# Patient Record
Sex: FEMALE | Race: WHITE | Employment: OTHER | URBAN - NONMETROPOLITAN AREA
[De-identification: names, ages, dates, MRNs, and addresses within clinical notes are randomized per-mention and may not be internally consistent; named-entity substitution may affect disease eponyms.]

---

## 2018-09-15 ENCOUNTER — APPOINTMENT (OUTPATIENT)
Dept: INTERVENTIONAL RADIOLOGY/VASCULAR | Age: 73
DRG: 202 | End: 2018-09-15
Payer: MEDICARE

## 2018-09-15 ENCOUNTER — APPOINTMENT (OUTPATIENT)
Dept: GENERAL RADIOLOGY | Age: 73
DRG: 202 | End: 2018-09-15
Payer: MEDICARE

## 2018-09-15 ENCOUNTER — HOSPITAL ENCOUNTER (INPATIENT)
Age: 73
LOS: 6 days | Discharge: NURSING, REHAB OR LONG-TERM CARE | DRG: 202 | End: 2018-09-21
Attending: INTERNAL MEDICINE
Payer: MEDICARE

## 2018-09-15 ENCOUNTER — APPOINTMENT (OUTPATIENT)
Dept: CT IMAGING | Age: 73
DRG: 202 | End: 2018-09-15
Payer: MEDICARE

## 2018-09-15 DIAGNOSIS — R50.9 FEBRILE ILLNESS: Primary | ICD-10-CM

## 2018-09-15 LAB
ALBUMIN SERPL-MCNC: 3.8 G/DL (ref 3.5–5.1)
ALP BLD-CCNC: 79 U/L (ref 38–126)
ALT SERPL-CCNC: 9 U/L (ref 11–66)
ANION GAP SERPL CALCULATED.3IONS-SCNC: 14 MEQ/L (ref 8–16)
AST SERPL-CCNC: 21 U/L (ref 5–40)
BACTERIA: ABNORMAL /HPF
BASE EXCESS (CALCULATED): 1.7 MMOL/L (ref -2.5–2.5)
BASOPHILS # BLD: 0.3 %
BASOPHILS ABSOLUTE: 0 THOU/MM3 (ref 0–0.1)
BILIRUB SERPL-MCNC: 0.6 MG/DL (ref 0.3–1.2)
BILIRUBIN DIRECT: < 0.2 MG/DL (ref 0–0.3)
BILIRUBIN URINE: NEGATIVE
BLOOD, URINE: NEGATIVE
BUN BLDV-MCNC: 16 MG/DL (ref 7–22)
C-REACTIVE PROTEIN: 0.07 MG/DL (ref 0–1)
CALCIUM SERPL-MCNC: 8.6 MG/DL (ref 8.5–10.5)
CASTS 2: ABNORMAL /LPF
CASTS UA: ABNORMAL /LPF
CHARACTER, URINE: CLEAR
CHLORIDE BLD-SCNC: 99 MEQ/L (ref 98–111)
CO2: 24 MEQ/L (ref 23–33)
COLLECTED BY:: ABNORMAL
COLOR: YELLOW
CREAT SERPL-MCNC: 1 MG/DL (ref 0.4–1.2)
CRYSTALS, UA: ABNORMAL
DEVICE: ABNORMAL
EKG ATRIAL RATE: 87 BPM
EKG P AXIS: 53 DEGREES
EKG P-R INTERVAL: 176 MS
EKG Q-T INTERVAL: 366 MS
EKG QRS DURATION: 80 MS
EKG QTC CALCULATION (BAZETT): 440 MS
EKG R AXIS: 38 DEGREES
EKG T AXIS: 54 DEGREES
EKG VENTRICULAR RATE: 87 BPM
EOSINOPHIL # BLD: 0 %
EOSINOPHILS ABSOLUTE: 0 THOU/MM3 (ref 0–0.4)
EPITHELIAL CELLS, UA: ABNORMAL /HPF
ERYTHROCYTE [DISTWIDTH] IN BLOOD BY AUTOMATED COUNT: 12.2 % (ref 11.5–14.5)
ERYTHROCYTE [DISTWIDTH] IN BLOOD BY AUTOMATED COUNT: 43.2 FL (ref 35–45)
FLU A ANTIGEN: NEGATIVE
FLU B ANTIGEN: NEGATIVE
GFR SERPL CREATININE-BSD FRML MDRD: 55 ML/MIN/1.73M2
GLUCOSE BLD-MCNC: 112 MG/DL (ref 70–108)
GLUCOSE URINE: NEGATIVE MG/DL
HCO3: 26 MMOL/L (ref 23–28)
HCT VFR BLD CALC: 39.5 % (ref 37–47)
HEMOGLOBIN: 13.3 GM/DL (ref 12–16)
IMMATURE GRANS (ABS): 0.07 THOU/MM3 (ref 0–0.07)
IMMATURE GRANULOCYTES: 0.7 %
KETONES, URINE: 15
LACTIC ACID, SEPSIS: 1.2 MMOL/L (ref 0.5–1.9)
LACTIC ACID: 1.5 MMOL/L (ref 0.5–2.2)
LEUKOCYTE ESTERASE, URINE: NEGATIVE
LIPASE: 53.9 U/L (ref 5.6–51.3)
LYMPHOCYTES # BLD: 17.2 %
LYMPHOCYTES ABSOLUTE: 1.8 THOU/MM3 (ref 1–4.8)
MCH RBC QN AUTO: 32.7 PG (ref 26–33)
MCHC RBC AUTO-ENTMCNC: 33.7 GM/DL (ref 32.2–35.5)
MCV RBC AUTO: 97.1 FL (ref 81–99)
MISCELLANEOUS 2: ABNORMAL
MONOCYTES # BLD: 5 %
MONOCYTES ABSOLUTE: 0.5 THOU/MM3 (ref 0.4–1.3)
NITRITE, URINE: NEGATIVE
NUCLEATED RED BLOOD CELLS: 0 /100 WBC
O2 SATURATION: 91 %
OSMOLALITY CALCULATION: 275.8 MOSMOL/KG (ref 275–300)
PCO2: 37 MMHG (ref 35–45)
PH BLOOD GAS: 7.45 (ref 7.35–7.45)
PH UA: 6
PLATELET # BLD: 176 THOU/MM3 (ref 130–400)
PMV BLD AUTO: 9.6 FL (ref 9.4–12.4)
PO2: 59 MMHG (ref 71–104)
POTASSIUM SERPL-SCNC: 4.1 MEQ/L (ref 3.5–5.2)
PRO-BNP: 650.2 PG/ML (ref 0–900)
PROCALCITONIN: 0.05 NG/ML (ref 0.01–0.09)
PROTEIN UA: 30
RBC # BLD: 4.07 MILL/MM3 (ref 4.2–5.4)
RBC URINE: ABNORMAL /HPF
RENAL EPITHELIAL, UA: ABNORMAL
SEG NEUTROPHILS: 76.8 %
SEGMENTED NEUTROPHILS ABSOLUTE COUNT: 8.1 THOU/MM3 (ref 1.8–7.7)
SODIUM BLD-SCNC: 137 MEQ/L (ref 135–145)
SOURCE, BLOOD GAS: ABNORMAL
SPECIFIC GRAVITY, URINE: 1.02 (ref 1–1.03)
TOTAL PROTEIN: 7.1 G/DL (ref 6.1–8)
TROPONIN T: < 0.01 NG/ML
UROBILINOGEN, URINE: 0.2 EU/DL
WBC # BLD: 10.5 THOU/MM3 (ref 4.8–10.8)
WBC UA: ABNORMAL /HPF
YEAST: ABNORMAL

## 2018-09-15 PROCEDURE — 83605 ASSAY OF LACTIC ACID: CPT

## 2018-09-15 PROCEDURE — 99285 EMERGENCY DEPT VISIT HI MDM: CPT

## 2018-09-15 PROCEDURE — 96365 THER/PROPH/DIAG IV INF INIT: CPT

## 2018-09-15 PROCEDURE — 1200000003 HC TELEMETRY R&B

## 2018-09-15 PROCEDURE — 93005 ELECTROCARDIOGRAM TRACING: CPT | Performed by: INTERNAL MEDICINE

## 2018-09-15 PROCEDURE — 83690 ASSAY OF LIPASE: CPT

## 2018-09-15 PROCEDURE — 70450 CT HEAD/BRAIN W/O DYE: CPT

## 2018-09-15 PROCEDURE — 93010 ELECTROCARDIOGRAM REPORT: CPT | Performed by: INTERNAL MEDICINE

## 2018-09-15 PROCEDURE — 72170 X-RAY EXAM OF PELVIS: CPT

## 2018-09-15 PROCEDURE — 2580000003 HC RX 258: Performed by: INTERNAL MEDICINE

## 2018-09-15 PROCEDURE — 85025 COMPLETE CBC W/AUTO DIFF WBC: CPT

## 2018-09-15 PROCEDURE — 2709999900 HC NON-CHARGEABLE SUPPLY

## 2018-09-15 PROCEDURE — 81001 URINALYSIS AUTO W/SCOPE: CPT

## 2018-09-15 PROCEDURE — 6360000002 HC RX W HCPCS: Performed by: INTERNAL MEDICINE

## 2018-09-15 PROCEDURE — 72125 CT NECK SPINE W/O DYE: CPT

## 2018-09-15 PROCEDURE — 82803 BLOOD GASES ANY COMBINATION: CPT

## 2018-09-15 PROCEDURE — 80053 COMPREHEN METABOLIC PANEL: CPT

## 2018-09-15 PROCEDURE — 71045 X-RAY EXAM CHEST 1 VIEW: CPT

## 2018-09-15 PROCEDURE — 84484 ASSAY OF TROPONIN QUANT: CPT

## 2018-09-15 PROCEDURE — 6370000000 HC RX 637 (ALT 250 FOR IP): Performed by: INTERNAL MEDICINE

## 2018-09-15 PROCEDURE — 83880 ASSAY OF NATRIURETIC PEPTIDE: CPT

## 2018-09-15 PROCEDURE — 87804 INFLUENZA ASSAY W/OPTIC: CPT

## 2018-09-15 PROCEDURE — 87040 BLOOD CULTURE FOR BACTERIA: CPT

## 2018-09-15 PROCEDURE — 86140 C-REACTIVE PROTEIN: CPT

## 2018-09-15 PROCEDURE — 36600 WITHDRAWAL OF ARTERIAL BLOOD: CPT

## 2018-09-15 PROCEDURE — 84145 PROCALCITONIN (PCT): CPT

## 2018-09-15 PROCEDURE — 93880 EXTRACRANIAL BILAT STUDY: CPT

## 2018-09-15 PROCEDURE — 36415 COLL VENOUS BLD VENIPUNCTURE: CPT

## 2018-09-15 PROCEDURE — 82248 BILIRUBIN DIRECT: CPT

## 2018-09-15 RX ORDER — SODIUM CHLORIDE 0.9 % (FLUSH) 0.9 %
10 SYRINGE (ML) INJECTION EVERY 12 HOURS SCHEDULED
Status: DISCONTINUED | OUTPATIENT
Start: 2018-09-15 | End: 2018-09-19 | Stop reason: SDUPTHER

## 2018-09-15 RX ORDER — TRAMADOL HYDROCHLORIDE 50 MG/1
50 TABLET ORAL EVERY 6 HOURS PRN
COMMUNITY

## 2018-09-15 RX ORDER — AZITHROMYCIN 250 MG/1
500 TABLET, FILM COATED ORAL ONCE
Status: COMPLETED | OUTPATIENT
Start: 2018-09-15 | End: 2018-09-15

## 2018-09-15 RX ORDER — ACETAMINOPHEN 325 MG/1
650 TABLET ORAL EVERY 4 HOURS PRN
Status: DISCONTINUED | OUTPATIENT
Start: 2018-09-15 | End: 2018-09-21 | Stop reason: HOSPADM

## 2018-09-15 RX ORDER — SODIUM CHLORIDE 0.9 % (FLUSH) 0.9 %
10 SYRINGE (ML) INJECTION PRN
Status: DISCONTINUED | OUTPATIENT
Start: 2018-09-15 | End: 2018-09-19 | Stop reason: SDUPTHER

## 2018-09-15 RX ORDER — 0.9 % SODIUM CHLORIDE 0.9 %
1000 INTRAVENOUS SOLUTION INTRAVENOUS ONCE
Status: COMPLETED | OUTPATIENT
Start: 2018-09-15 | End: 2018-09-15

## 2018-09-15 RX ORDER — ACETAMINOPHEN 325 MG/1
650 TABLET ORAL ONCE
Status: COMPLETED | OUTPATIENT
Start: 2018-09-15 | End: 2018-09-15

## 2018-09-15 RX ORDER — SODIUM CHLORIDE 9 MG/ML
INJECTION, SOLUTION INTRAVENOUS CONTINUOUS
Status: DISCONTINUED | OUTPATIENT
Start: 2018-09-15 | End: 2018-09-18

## 2018-09-15 RX ORDER — TRAMADOL HYDROCHLORIDE 50 MG/1
50 TABLET ORAL EVERY 6 HOURS PRN
Status: DISCONTINUED | OUTPATIENT
Start: 2018-09-15 | End: 2018-09-18

## 2018-09-15 RX ORDER — AZITHROMYCIN 250 MG/1
250 TABLET, FILM COATED ORAL DAILY
Status: COMPLETED | OUTPATIENT
Start: 2018-09-16 | End: 2018-09-19

## 2018-09-15 RX ORDER — GUAIFENESIN, PSEUDOEPHEDRINE HYDROCHLORIDE 600; 60 MG/1; MG/1
1 TABLET, EXTENDED RELEASE ORAL EVERY 12 HOURS
COMMUNITY

## 2018-09-15 RX ADMIN — CEFEPIME HYDROCHLORIDE 2 G: 2 INJECTION, POWDER, FOR SOLUTION INTRAVENOUS at 09:48

## 2018-09-15 RX ADMIN — VANCOMYCIN HYDROCHLORIDE 750 MG: 1 INJECTION, POWDER, LYOPHILIZED, FOR SOLUTION INTRAVENOUS at 11:35

## 2018-09-15 RX ADMIN — SODIUM CHLORIDE 1000 ML: 9 INJECTION, SOLUTION INTRAVENOUS at 08:50

## 2018-09-15 RX ADMIN — ACETAMINOPHEN 650 MG: 325 TABLET ORAL at 08:38

## 2018-09-15 RX ADMIN — SODIUM CHLORIDE: 9 INJECTION, SOLUTION INTRAVENOUS at 23:54

## 2018-09-15 RX ADMIN — ACETAMINOPHEN 650 MG: 325 TABLET ORAL at 17:59

## 2018-09-15 RX ADMIN — CEFTRIAXONE SODIUM 1 G: 1 INJECTION, POWDER, FOR SOLUTION INTRAMUSCULAR; INTRAVENOUS at 17:59

## 2018-09-15 RX ADMIN — SODIUM CHLORIDE: 9 INJECTION, SOLUTION INTRAVENOUS at 13:10

## 2018-09-15 RX ADMIN — AZITHROMYCIN 500 MG: 250 TABLET, FILM COATED ORAL at 17:59

## 2018-09-15 ASSESSMENT — ENCOUNTER SYMPTOMS
VOMITING: 0
SORE THROAT: 0
ABDOMINAL PAIN: 0
CONSTIPATION: 0
RHINORRHEA: 0
COUGH: 1
WHEEZING: 0
NAUSEA: 0
SHORTNESS OF BREATH: 0
BACK PAIN: 0
BLOOD IN STOOL: 0
DIARRHEA: 0

## 2018-09-15 ASSESSMENT — PAIN SCALES - GENERAL
PAINLEVEL_OUTOF10: 4
PAINLEVEL_OUTOF10: 0
PAINLEVEL_OUTOF10: 0

## 2018-09-15 NOTE — ED NOTES
Patient presents to the emergency dept with high fever, shortness of breath, slight confusion. Upon arrival from EMS, it is difficult to get some of the information from the patient but then after patients  arrives, he states that he knows that she has been ill for the past two weeks and taking OTC cold medications but this morning she fell off the toilet in between the shower and the toilet.  Patient has not been eating or drinking in the past day or two, patient states that she just has not felt hungry     Frank Crowely RN  09/15/18 3149

## 2018-09-15 NOTE — H&P
Internal Medicine  History and Physical    Patient:  Marilia Chavez  MRN: [id number]      History Obtained From:  patient  PCP: Cuba Dominguez MD    CHIEF COMPLAINT:     Fever    Urinary Frequency    Cough       . Fall    HISTORY OF PRESENT ILLNESS:   The patient is a 81 y.o. female who presents with fever and generalized malaise. The patient has a two week history of a fever, generalized malaise, urinary frequency, nonproductive cough, and decreased appetite. The patient states that her symptoms have been persistent and unchanged since onset. She denies any abdominal pain, chest pain, nausea, vomiting, diarrhea, constipation, or other urinary symptoms. She reports dizziness with a fall at home yesterday. No LOC    Past Medical History:        Diagnosis Date    Arthritis     Back ache        Past Surgical History:        Procedure Laterality Date    JOINT REPLACEMENT Right        Medications Prior to Admission:    Prior to Admission medications    Medication Sig Start Date End Date Taking? Authorizing Provider   traMADol (ULTRAM) 50 MG tablet Take 50 mg by mouth every 6 hours as needed for Pain. .   Yes Historical Provider, MD   pseudoephedrine-guaiFENesin (MUCINEX D)  MG per extended release tablet Take 1 tablet by mouth every 12 hours   Yes Historical Provider, MD       Allergies:  Patient has no known allergies. Social History:   TOBACCO:   reports that she has been smoking Cigarettes. She has been smoking about 0.50 packs per day. She has never used smokeless tobacco.  Has smoked for ~52 years  ETOH:   reports that she does not drink alcohol.       Family History:   Family History   Problem Relation Age of Onset    Cancer Mother        REVIEW OF SYSTEMS:  CONSTITUTIONAL:  positive for  fatigue and malaise, fever  negative for chills  EYES:  negative for  irritation, redness and icterus  HEENT:  negative for  epistaxis, sore mouth and sore throat  RESPIRATORY:  positive for  dry cough and dyspnea  negative for  wheezing, hemoptysis and chest pain  CARDIOVASCULAR:  negative for  palpitations, orthopnea, PND, edema  GASTROINTESTINAL:  negative for nausea, vomiting, change in bowel habits, abdominal pain, abdominal mass and abdominal distention  GENITOURINARY:  positive for frequency  negative for dysuria and hematuria  ENDOCRINE:  negative for heat intolerance and cold intolerance  MUSCULOSKELETAL:  negative  NEUROLOGICAL:  positive for dizziness and weakness  negative for visual disturbance, coordination problems and gait problems  BEHAVIOR/PSYCH:  negative    Physical Exam:    Vitals: BP (!) 145/74   Pulse 75   Temp 98.2 °F (36.8 °C) (Oral)   Resp 18   Ht 5' 1\" (1.549 m)   Wt 118 lb (53.5 kg)   SpO2 98%   BMI 22.30 kg/m²   T gor123.7  CONSTITUTIONAL:  fatigued, alert, cooperative, no apparent distress and normal weight  EYES:  extra-ocular muscles intact  ENT:  normocepalic, without obvious abnormality  NECK:  supple, symmetrical, trachea midline  LUNGS:  Diminished BS fredy, no rhonchi  CARDIOVASCULAR:  normal S1 and S2  ABDOMEN:  normal bowel sounds, soft, non-distended, non-tender and no hepatosplenomegally  MUSCULOSKELETAL:  there is no redness, warmth, or swelling of the joints  NEUROLOGIC:  Mental Status Exam:  Level of Alertness:   awake  Orientation:   person, place, time  Memory:   normal  Cranial Nerves:  cranial nerves II-XII are grossly intact  Motor Exam:  Motor exam is symmetrical 5 out of 5 all extremities bilaterally  SKIN:  normal skin color, texture, turgor      CBC:   Recent Labs      09/15/18   0852   WBC  10.5   HGB  13.3   PLT  176     BMP:    Recent Labs      09/15/18   0852   NA  137   K  4.1   CL  99   CO2  24   BUN  16   CREATININE  1.0   GLUCOSE  112*     Hepatic:   Recent Labs      09/15/18   0852   AST  21   ALT  9*   BILITOT  0.6   ALKPHOS  79     09/15/18 0921    Specimen Source: Nasopharyngeal     Flu A Antigen NEGATIVE    Flu B Antigen NEGATIVE     CT CERVICAL SPINE WO CONTRAST   No acute fracture or traumatic malalignment is identified. CT head:   No acute intracranial abnormality is identified. Small foci of low-attenuation are noted within the bilateral basal ganglia which likely correspond to age indeterminant lacunar infarcts. Sequela of chronic microvascular ischemic change are also noted   within the periventricular and deep cerebral white matter. If there is clinical concern for acute ischemia, MRI is more sensitive and may be considered. PA/lat CXR:   The heart size is within normal limits.  The mediastinum is not widened.  There are no pulmonary infiltrates or effusions.  The pulmonary vascularity is within normal limits. The aorta is noted to be tortuous. There is an S-shaped scoliotic curvature of the thoracic spine. No suspicious osseous lesion is identified. Impression:     No acute intrathoracic abnormality is identified. EKG: NSR 87 bpm, ORIN, NI  09/15/18 0813    Specimen Source: Blood gases     pH, Blood Gas 7.45    PCO2 37 mmhg    PO2 59 (L) mmhg    HCO3 26 mmol/l    Base Excess (Calculated) 1.7 mmol/l    O2 Sat 91 %    DEVICE Room Air    Source: R Brach    COLLECTED BY: [id number]       Assessment and Plan   1. Acute febrile illness. 2. Acute bronchitis  3. Acute hypoxemic resp failure  4. Syncope  5. HTN    BC x2  Empiric antibiotics. IV hydration. Carotid USS. Control BP. Lovenox.     Patient Active Problem List   Diagnosis Code    Febrile illness, acute R50.9       Aki Agarwal MD  Admitting Internist

## 2018-09-15 NOTE — ED PROVIDER NOTES
[facility]  eMERGENCY dEPARTMENT eNCOUnter          CHIEF COMPLAINT       Chief Complaint   Patient presents with    Fever    Urinary Frequency    Cough       Nurses Notes reviewed and I agree except as noted in the HPI. HISTORY OF PRESENT ILLNESS    Deb Lezama is a 81 y.o. female who presents to the ED for evaluation of a fever and generalized malaise. The patient has a two week history of a fever, generalized malaise, urinary frequency, nonproductive cough, and decreased appetite. The patient states that her symptoms have been persistent and unchanged since onset. She denies any abdominal pain, chest pain, nausea, vomiting, diarrhea, constipation, or other urinary symptoms. The patient states that her niece was sick with similar symptoms recently. The patient has not followed up with her PCP for the symptoms. The patient's  states that she fell getting off of the toilet this morning, patient and  do not believe that she hit her head. No additional complaints or concerns at the time of initial evaluation. REVIEW OF SYSTEMS     Review of Systems   Constitutional: Positive for appetite change, fatigue and fever. Negative for chills and diaphoresis. HENT: Negative for congestion, rhinorrhea and sore throat. Respiratory: Positive for cough. Negative for shortness of breath and wheezing. Cardiovascular: Negative for chest pain, palpitations and leg swelling. Gastrointestinal: Negative for abdominal pain, blood in stool, constipation, diarrhea, nausea and vomiting. Genitourinary: Positive for frequency. Negative for decreased urine volume, difficulty urinating, dysuria, hematuria and urgency. Musculoskeletal: Positive for myalgias. Negative for arthralgias, back pain, joint swelling and neck pain. Skin: Negative for rash. Neurological: Positive for weakness. Negative for dizziness, light-headedness, numbness and headaches.        PAST MEDICAL HISTORY    has a past medical history of Back ache. SURGICAL HISTORY      has a past surgical history that includes joint replacement (Right). CURRENT MEDICATIONS       Previous Medications    PSEUDOEPHEDRINE-GUAIFENESIN (MUCINEX D)  MG PER EXTENDED RELEASE TABLET    Take 1 tablet by mouth every 12 hours    TRAMADOL (ULTRAM) 50 MG TABLET    Take 50 mg by mouth every 6 hours as needed for Pain. Reymundo SHAH     has no allergies on file. FAMILY HISTORY     has no family status information on file. family history is not on file. SOCIAL HISTORY      reports that she has been smoking Cigarettes. She has never used smokeless tobacco. She reports that she does not drink alcohol. PHYSICAL EXAM     INITIAL VITALS:  height is 5' 1\" (1.549 m) and weight is 118 lb (53.5 kg). Her core temperature is 103.3 °F (39.6 °C). Her blood pressure is 153/83 (abnormal) and her pulse is 81. Her respiration is 16 and oxygen saturation is 97%. Physical Exam   Constitutional: She is oriented to person, place, and time. She appears well-developed and well-nourished. No distress. HENT:   Head: Normocephalic and atraumatic. Right Ear: External ear normal.   Left Ear: External ear normal.   Mouth/Throat: Oropharynx is clear and moist. Mucous membranes are dry. Eyes: Conjunctivae and EOM are normal.   Neck: Normal range of motion. Neck supple. Cardiovascular: Normal rate, regular rhythm, normal heart sounds and intact distal pulses. Exam reveals no friction rub. No murmur heard. Pulmonary/Chest: Effort normal. No respiratory distress. She has no wheezes. She has rhonchi. She has no rales. She exhibits no tenderness. Abdominal: Soft. Bowel sounds are normal. She exhibits no distension. There is no tenderness. There is no rebound. Musculoskeletal: Normal range of motion. She exhibits no edema. Neurological: She is alert and oriented to person, place, and time. She exhibits normal muscle tone.    Skin: Skin is warm and intrathoracic abnormality is identified. **This report has been created using voice recognition software. It may contain minor errors which are inherent in voice recognition technology. **      Final report electronically signed by Dr. Nigel Johnston on 9/15/2018 8:54 AM           LABS:   Labs Reviewed   CBC WITH AUTO DIFFERENTIAL - Abnormal; Notable for the following:        Result Value    RBC 4.07 (*)     Segs Absolute 8.1 (*)     All other components within normal limits   LIPASE - Abnormal; Notable for the following:     Lipase 53.9 (*)     All other components within normal limits   HEPATIC FUNCTION PANEL - Abnormal; Notable for the following:     ALT 9 (*)     All other components within normal limits   BASIC METABOLIC PANEL - Abnormal; Notable for the following:     Glucose 112 (*)     All other components within normal limits   BLOOD GAS, ARTERIAL - Abnormal; Notable for the following:     PO2 59 (*)     All other components within normal limits   URINE WITH REFLEXED MICRO - Abnormal; Notable for the following:     Ketones, Urine 15 (*)     Protein, UA 30 (*)     All other components within normal limits   GLOMERULAR FILTRATION RATE, ESTIMATED - Abnormal; Notable for the following:     Est, Glom Filt Rate 55 (*)     All other components within normal limits   RAPID INFLUENZA A/B ANTIGENS   CULTURE BLOOD #2   CULTURE BLOOD #1   LACTIC ACID, PLASMA   TROPONIN   ANION GAP   OSMOLALITY       EMERGENCY DEPARTMENT COURSE:   Vitals:    Vitals:    09/15/18 0836 09/15/18 0858 09/15/18 0900 09/15/18 0945   BP: (!) 167/88 (!) 143/85 (!) 143/85 (!) 153/83   Pulse: 88 88 86 81   Resp: 17 16  16   Temp: 102.7 °F (39.3 °C) 103.7 °F (39.8 °C)  103.3 °F (39.6 °C)   TempSrc:  Core  Core   SpO2: 97% 100% 100% 97%   Weight:       Height:           8:20 AM: The patient was seen and evaluated. MDM:  Patient has been sick for 2 weeks. Had a fall yesterday.  Patient CT scan, x-rays of the pelvis was negative for any acute problem. Most of patient's labs were also within normal range. Patient did have a fever of 103.7 in the emergency department which was controlled with Tylenol. There is no obvious source of patient's sepsis. Patient was started on IV antibiotics and fluids and patient is feeling much better during her stay in the emergency department. All of patient's labs the testing that reviewed discussed with the patient and also with the admitting physician will be Dr. Chan Money:   None      CONSULTS:  None     PROCEDURES:  None      FINAL IMPRESSION      1. Febrile illness          DISPOSITION/PLAN   Admit    PATIENT REFERRED TO:  Hao Simon MD  [address]  [phone]            DISCHARGE MEDICATIONS:  New Prescriptions    No medications on file       (Please note that portions of this note were completed with a voice recognition program.  Efforts were made to edit the dictations but occasionally words are mis-transcribed.)    Scribe: Marykay Schirmer 9/15/18 8:20 AM Scribing for and in the presence of Karl Snowden MD.    Signed by: Marykay Schirmer, Scribe, 09/15/18 10:59 AM    Provider:  I personally performed the services described in the documentation, reviewed and edited the documentation which was dictated to the scribe in my presence, and it accurately records my words and actions.     Karl Snowden MD 9/15/18 10:59 AM        Karl Snowden MD  09/15/18 0489

## 2018-09-15 NOTE — PROGRESS NOTES
Pt admitted to  [address] 18 02 19 ambulatory and in a wheelchair. Complaints: febrile illness. IV normal saline infusing into the forearm left, condition patent and no redness at a rate of 100 mls/ hour with about 700 mls in the bag still. IV site free of s/s of infection or infiltration. Vital signs obtained. Assessment and data collection initiated. Oriented to room. Policies and procedures for 6K explained. All questions answered with no further questions at this time. Fall prevention and safety brochure discussed with patient. Bed alarm on. Call light in reach.

## 2018-09-15 NOTE — ED NOTES
Tried to call to notify floor of transport x3 times. Line kept ringing, no answer.  Will continue with transport per charge YANA Peña, LPN  44/82/27 [id number]

## 2018-09-16 ENCOUNTER — APPOINTMENT (OUTPATIENT)
Dept: GENERAL RADIOLOGY | Age: 73
DRG: 202 | End: 2018-09-16
Payer: MEDICARE

## 2018-09-16 LAB
ADENOVIRUS F 40 41 PCR: NOT DETECTED
ANION GAP SERPL CALCULATED.3IONS-SCNC: 12 MEQ/L (ref 8–16)
ASTROVIRUS PCR: NOT DETECTED
CAMPYLOBACTER PCR: NOT DETECTED
CHLORIDE BLD-SCNC: 105 MEQ/L (ref 98–111)
CLOSTRIDIUM DIFFICILE, PCR: NOT DETECTED
CO2: 22 MEQ/L (ref 23–33)
CRYPTOSPORIDIUM PCR: NOT DETECTED
CYCLOSPORA CAYETANENSIS PCR: NOT DETECTED
E COLI 0157 PCR: NORMAL
E COLI ENTEROAGGREGATIVE PCR: NOT DETECTED
E COLI ENTEROPATHOGENIC PCR: NOT DETECTED
E COLI ENTEROTOXIGENIC PCR: NOT DETECTED
E COLI SHIGA LIKE TOXIN PCR: NOT DETECTED
E COLI SHIGELLA/ENTEROINVASIVE PCR: NOT DETECTED
E HISTOLYTICA GI FILM ARRAY: NOT DETECTED
ERYTHROCYTE [DISTWIDTH] IN BLOOD BY AUTOMATED COUNT: 11.9 % (ref 11.5–14.5)
ERYTHROCYTE [DISTWIDTH] IN BLOOD BY AUTOMATED COUNT: 44.3 FL (ref 35–45)
GIARDIA LAMBLIA PCR: NOT DETECTED
HCT VFR BLD CALC: 36.1 % (ref 37–47)
HEMOGLOBIN: 11.9 GM/DL (ref 12–16)
MAGNESIUM: 1.7 MG/DL (ref 1.6–2.4)
MCH RBC QN AUTO: 33.3 PG (ref 26–33)
MCHC RBC AUTO-ENTMCNC: 33 GM/DL (ref 32.2–35.5)
MCV RBC AUTO: 101.1 FL (ref 81–99)
NOROVIRUS GI GII PCR: NOT DETECTED
PLATELET # BLD: 143 THOU/MM3 (ref 130–400)
PLESIOMONAS SHIGELLOIDES PCR: NOT DETECTED
PMV BLD AUTO: 9.8 FL (ref 9.4–12.4)
POTASSIUM REFLEX MAGNESIUM: 3.1 MEQ/L (ref 3.5–5.2)
RBC # BLD: 3.57 MILL/MM3 (ref 4.2–5.4)
ROTAVIRUS A PCR: NOT DETECTED
SALMONELLA PCR: NOT DETECTED
SAPOVIRUS PCR: NOT DETECTED
SEDIMENTATION RATE, ERYTHROCYTE: 35 MM/HR (ref 0–20)
SODIUM BLD-SCNC: 139 MEQ/L (ref 135–145)
TSH SERPL DL<=0.05 MIU/L-ACNC: 0.49 UIU/ML (ref 0.4–4.2)
VIBRIO CHOLERAE PCR: NOT DETECTED
VIBRIO PCR: NOT DETECTED
WBC # BLD: 13.7 THOU/MM3 (ref 4.8–10.8)
YERSINIA ENTEROCOLITICA PCR: NOT DETECTED

## 2018-09-16 PROCEDURE — 1200000003 HC TELEMETRY R&B

## 2018-09-16 PROCEDURE — 83735 ASSAY OF MAGNESIUM: CPT

## 2018-09-16 PROCEDURE — 6360000002 HC RX W HCPCS: Performed by: INTERNAL MEDICINE

## 2018-09-16 PROCEDURE — 85651 RBC SED RATE NONAUTOMATED: CPT

## 2018-09-16 PROCEDURE — 84443 ASSAY THYROID STIM HORMONE: CPT

## 2018-09-16 PROCEDURE — 71045 X-RAY EXAM CHEST 1 VIEW: CPT

## 2018-09-16 PROCEDURE — 2580000003 HC RX 258: Performed by: INTERNAL MEDICINE

## 2018-09-16 PROCEDURE — 6370000000 HC RX 637 (ALT 250 FOR IP): Performed by: INTERNAL MEDICINE

## 2018-09-16 PROCEDURE — 80051 ELECTROLYTE PANEL: CPT

## 2018-09-16 PROCEDURE — 36415 COLL VENOUS BLD VENIPUNCTURE: CPT

## 2018-09-16 PROCEDURE — 85027 COMPLETE CBC AUTOMATED: CPT

## 2018-09-16 PROCEDURE — 87507 IADNA-DNA/RNA PROBE TQ 12-25: CPT

## 2018-09-16 RX ORDER — NICOTINE 21 MG/24HR
1 PATCH, TRANSDERMAL 24 HOURS TRANSDERMAL DAILY
Status: DISCONTINUED | OUTPATIENT
Start: 2018-09-16 | End: 2018-09-21 | Stop reason: HOSPADM

## 2018-09-16 RX ORDER — METOPROLOL TARTRATE 50 MG/1
50 TABLET, FILM COATED ORAL 2 TIMES DAILY
Status: DISCONTINUED | OUTPATIENT
Start: 2018-09-16 | End: 2018-09-21 | Stop reason: HOSPADM

## 2018-09-16 RX ORDER — AMLODIPINE BESYLATE 5 MG/1
5 TABLET ORAL DAILY
Status: DISCONTINUED | OUTPATIENT
Start: 2018-09-16 | End: 2018-09-18

## 2018-09-16 RX ORDER — POTASSIUM CHLORIDE 750 MG/1
40 TABLET, FILM COATED, EXTENDED RELEASE ORAL ONCE
Status: COMPLETED | OUTPATIENT
Start: 2018-09-16 | End: 2018-09-16

## 2018-09-16 RX ADMIN — Medication 10 ML: at 08:52

## 2018-09-16 RX ADMIN — ENOXAPARIN SODIUM 40 MG: 40 INJECTION SUBCUTANEOUS at 08:52

## 2018-09-16 RX ADMIN — AMLODIPINE BESYLATE 5 MG: 5 TABLET ORAL at 14:07

## 2018-09-16 RX ADMIN — POTASSIUM CHLORIDE 40 MEQ: 750 TABLET, EXTENDED RELEASE ORAL at 10:08

## 2018-09-16 RX ADMIN — ACETAMINOPHEN 650 MG: 325 TABLET ORAL at 11:41

## 2018-09-16 RX ADMIN — CEFTRIAXONE SODIUM 1 G: 1 INJECTION, POWDER, FOR SOLUTION INTRAMUSCULAR; INTRAVENOUS at 18:06

## 2018-09-16 RX ADMIN — Medication 10 ML: at 20:14

## 2018-09-16 RX ADMIN — ACETAMINOPHEN 650 MG: 325 TABLET ORAL at 00:35

## 2018-09-16 RX ADMIN — AZITHROMYCIN 250 MG: 250 TABLET, FILM COATED ORAL at 08:52

## 2018-09-16 RX ADMIN — ACETAMINOPHEN 650 MG: 325 TABLET ORAL at 06:12

## 2018-09-16 RX ADMIN — METOPROLOL TARTRATE 50 MG: 50 TABLET, FILM COATED ORAL at 20:14

## 2018-09-16 RX ADMIN — ACETAMINOPHEN 650 MG: 325 TABLET ORAL at 18:41

## 2018-09-16 RX ADMIN — METOPROLOL TARTRATE 25 MG: 25 TABLET ORAL at 11:41

## 2018-09-16 RX ADMIN — SODIUM CHLORIDE: 9 INJECTION, SOLUTION INTRAVENOUS at 08:57

## 2018-09-16 RX ADMIN — SODIUM CHLORIDE: 9 INJECTION, SOLUTION INTRAVENOUS at 18:06

## 2018-09-16 ASSESSMENT — PAIN SCALES - GENERAL
PAINLEVEL_OUTOF10: 0

## 2018-09-17 ENCOUNTER — APPOINTMENT (OUTPATIENT)
Dept: CT IMAGING | Age: 73
DRG: 202 | End: 2018-09-17
Payer: MEDICARE

## 2018-09-17 LAB
ANION GAP SERPL CALCULATED.3IONS-SCNC: 8 MEQ/L (ref 8–16)
BUN BLDV-MCNC: 9 MG/DL (ref 7–22)
CALCIUM SERPL-MCNC: 8.2 MG/DL (ref 8.5–10.5)
CHLORIDE BLD-SCNC: 104 MEQ/L (ref 98–111)
CO2: 25 MEQ/L (ref 23–33)
CREAT SERPL-MCNC: 0.7 MG/DL (ref 0.4–1.2)
ERYTHROCYTE [DISTWIDTH] IN BLOOD BY AUTOMATED COUNT: 11.9 % (ref 11.5–14.5)
ERYTHROCYTE [DISTWIDTH] IN BLOOD BY AUTOMATED COUNT: 40.2 FL (ref 35–45)
GFR SERPL CREATININE-BSD FRML MDRD: 83 ML/MIN/1.73M2
GLUCOSE BLD-MCNC: 100 MG/DL (ref 70–108)
HCT VFR BLD CALC: 30.1 % (ref 37–47)
HEMOGLOBIN: 10.9 GM/DL (ref 12–16)
MCH RBC QN AUTO: 33.5 PG (ref 26–33)
MCHC RBC AUTO-ENTMCNC: 36.2 GM/DL (ref 32.2–35.5)
MCV RBC AUTO: 92.6 FL (ref 81–99)
PLATELET # BLD: 135 THOU/MM3 (ref 130–400)
PMV BLD AUTO: 10.2 FL (ref 9.4–12.4)
POTASSIUM SERPL-SCNC: 3.5 MEQ/L (ref 3.5–5.2)
RBC # BLD: 3.25 MILL/MM3 (ref 4.2–5.4)
SODIUM BLD-SCNC: 137 MEQ/L (ref 135–145)
WBC # BLD: 14 THOU/MM3 (ref 4.8–10.8)

## 2018-09-17 PROCEDURE — 6360000002 HC RX W HCPCS: Performed by: INTERNAL MEDICINE

## 2018-09-17 PROCEDURE — 85027 COMPLETE CBC AUTOMATED: CPT

## 2018-09-17 PROCEDURE — 6360000004 HC RX CONTRAST MEDICATION: Performed by: INTERNAL MEDICINE

## 2018-09-17 PROCEDURE — 6370000000 HC RX 637 (ALT 250 FOR IP): Performed by: INTERNAL MEDICINE

## 2018-09-17 PROCEDURE — 36415 COLL VENOUS BLD VENIPUNCTURE: CPT

## 2018-09-17 PROCEDURE — 2580000003 HC RX 258: Performed by: INTERNAL MEDICINE

## 2018-09-17 PROCEDURE — 80048 BASIC METABOLIC PNL TOTAL CA: CPT

## 2018-09-17 PROCEDURE — 1200000003 HC TELEMETRY R&B

## 2018-09-17 PROCEDURE — 74178 CT ABD&PLV WO CNTR FLWD CNTR: CPT

## 2018-09-17 RX ORDER — POTASSIUM CHLORIDE 20 MEQ/1
40 TABLET, EXTENDED RELEASE ORAL ONCE
Status: COMPLETED | OUTPATIENT
Start: 2018-09-17 | End: 2018-09-17

## 2018-09-17 RX ORDER — LOPERAMIDE HYDROCHLORIDE 2 MG/1
2 CAPSULE ORAL 4 TIMES DAILY PRN
Status: DISCONTINUED | OUTPATIENT
Start: 2018-09-17 | End: 2018-09-21 | Stop reason: HOSPADM

## 2018-09-17 RX ADMIN — POTASSIUM CHLORIDE 40 MEQ: 20 TABLET, EXTENDED RELEASE ORAL at 08:41

## 2018-09-17 RX ADMIN — SODIUM CHLORIDE: 9 INJECTION, SOLUTION INTRAVENOUS at 20:05

## 2018-09-17 RX ADMIN — SODIUM CHLORIDE: 9 INJECTION, SOLUTION INTRAVENOUS at 08:25

## 2018-09-17 RX ADMIN — ACETAMINOPHEN 650 MG: 325 TABLET ORAL at 16:23

## 2018-09-17 RX ADMIN — ACETAMINOPHEN 650 MG: 325 TABLET ORAL at 00:38

## 2018-09-17 RX ADMIN — AMLODIPINE BESYLATE 5 MG: 5 TABLET ORAL at 08:14

## 2018-09-17 RX ADMIN — ENOXAPARIN SODIUM 40 MG: 40 INJECTION SUBCUTANEOUS at 08:25

## 2018-09-17 RX ADMIN — LOPERAMIDE HYDROCHLORIDE 2 MG: 2 CAPSULE ORAL at 16:21

## 2018-09-17 RX ADMIN — AZITHROMYCIN 250 MG: 250 TABLET, FILM COATED ORAL at 08:14

## 2018-09-17 RX ADMIN — IOPAMIDOL 80 ML: 755 INJECTION, SOLUTION INTRAVENOUS at 18:20

## 2018-09-17 RX ADMIN — METOPROLOL TARTRATE 50 MG: 50 TABLET, FILM COATED ORAL at 20:05

## 2018-09-17 RX ADMIN — LOPERAMIDE HYDROCHLORIDE 2 MG: 2 CAPSULE ORAL at 18:48

## 2018-09-17 RX ADMIN — ACETAMINOPHEN 650 MG: 325 TABLET ORAL at 08:25

## 2018-09-17 RX ADMIN — CEFTRIAXONE SODIUM 1 G: 1 INJECTION, POWDER, FOR SOLUTION INTRAMUSCULAR; INTRAVENOUS at 18:48

## 2018-09-17 RX ADMIN — METOPROLOL TARTRATE 50 MG: 50 TABLET, FILM COATED ORAL at 08:14

## 2018-09-17 ASSESSMENT — PAIN SCALES - GENERAL
PAINLEVEL_OUTOF10: 0

## 2018-09-17 NOTE — CONSULTS
lives with family       FAMILY HISTORY:     Family History   Problem Relation Age of Onset    Cancer Mother        REVIEW OF SYSTEMS:     Constitutional: + fever, no night sweats,+ fatigue. Head: no head ache , no head injury, no migranes. Eye: no blurring of vision, no double vision. Ears: no hearing difficulty, no tinnitus  Mouth/throat: no ulceration, dental caries , dysphagia dry mouth  Lungs:+ cough no chest pain  CVS: no palpitation, no chest pain, no shortness of breath  GI: no abdominal pain, no nausea , no vomiting, no constipation  ANAMARIA: no dysuria, frequency and urgency, no hematuria, no kidney stones  Musculoskeletal: + joint pain, swelling , stiffness,  Endocrine: no polyuria, polydipsia, no cold or heat intolerance  Hematology: no anemia, no easy brusing or bleeding, no hx of clotting disorder  Dermatology: no skin rash, no eczema, no pruritis,  Psychiatry: no depression, no anxiety,no panic attacks, no suicide ideation    PHYSICAL EXAM:     /73   Pulse 65   Temp 97.9 °F (36.6 °C) (Oral)   Resp 19   Ht 5' 1\" (1.549 m)   Wt 120 lb (54.4 kg)   SpO2 99%   BMI 22.67 kg/m²   General:  Awake, alert,chronically sick looking  HEENT: pink conjunctiva, unicteric sclera, dry oral oral mucosa. Chest:  Bilateral diminished breath sound  Cardiovascular:  RRR ,S1S2, no murmur or gallop. Abdomen:  Soft, non tender to palpation. Extremities: no edema  Skin:  Warm and dry.   CNS:awake but weak, answers questions appropriately        LABS:     CBC:   Recent Labs      09/15/18   0852  09/16/18   0553  09/17/18   0609   WBC  10.5  13.7*  14.0*   HGB  13.3  11.9*  10.9*   PLT  176  143  135     BMP:    Recent Labs      09/15/18   0852  09/16/18   0711  09/17/18   0609   NA  137  139  137   K  4.1  3.1*  3.5   CL  99  105  104   CO2  24  22*  25   BUN  16   --   9   CREATININE  1.0   --   0.7   GLUCOSE  112*   --   100     Calcium:  Recent Labs      09/17/18   0609   CALCIUM  8.2*     Ionized Calcium:No

## 2018-09-18 ENCOUNTER — APPOINTMENT (OUTPATIENT)
Dept: MRI IMAGING | Age: 73
DRG: 202 | End: 2018-09-18
Payer: MEDICARE

## 2018-09-18 ENCOUNTER — APPOINTMENT (OUTPATIENT)
Dept: GENERAL RADIOLOGY | Age: 73
DRG: 202 | End: 2018-09-18
Payer: MEDICARE

## 2018-09-18 LAB
ALBUMIN SERPL-MCNC: 3.4 G/DL (ref 3.5–5.1)
ALP BLD-CCNC: 66 U/L (ref 38–126)
ALT SERPL-CCNC: 16 U/L (ref 11–66)
ANION GAP SERPL CALCULATED.3IONS-SCNC: 9 MEQ/L (ref 8–16)
AST SERPL-CCNC: 25 U/L (ref 5–40)
BILIRUB SERPL-MCNC: 0.3 MG/DL (ref 0.3–1.2)
BUN BLDV-MCNC: 8 MG/DL (ref 7–22)
CALCIUM SERPL-MCNC: 8.4 MG/DL (ref 8.5–10.5)
CHLORIDE BLD-SCNC: 104 MEQ/L (ref 98–111)
CO2: 26 MEQ/L (ref 23–33)
CREAT SERPL-MCNC: 0.7 MG/DL (ref 0.4–1.2)
ERYTHROCYTE [DISTWIDTH] IN BLOOD BY AUTOMATED COUNT: 12.1 % (ref 11.5–14.5)
ERYTHROCYTE [DISTWIDTH] IN BLOOD BY AUTOMATED COUNT: 42.6 FL (ref 35–45)
GFR SERPL CREATININE-BSD FRML MDRD: 82 ML/MIN/1.73M2
GLUCOSE BLD-MCNC: 98 MG/DL (ref 70–108)
HCT VFR BLD CALC: 31.9 % (ref 37–47)
HEMOGLOBIN: 10.9 GM/DL (ref 12–16)
MCH RBC QN AUTO: 32.9 PG (ref 26–33)
MCHC RBC AUTO-ENTMCNC: 34.2 GM/DL (ref 32.2–35.5)
MCV RBC AUTO: 96.4 FL (ref 81–99)
PLATELET # BLD: 159 THOU/MM3 (ref 130–400)
PMV BLD AUTO: 10.6 FL (ref 9.4–12.4)
POTASSIUM REFLEX MAGNESIUM: 3.6 MEQ/L (ref 3.5–5.2)
RBC # BLD: 3.31 MILL/MM3 (ref 4.2–5.4)
SODIUM BLD-SCNC: 139 MEQ/L (ref 135–145)
TOTAL PROTEIN: 6.2 G/DL (ref 6.1–8)
WBC # BLD: 10 THOU/MM3 (ref 4.8–10.8)

## 2018-09-18 PROCEDURE — 80053 COMPREHEN METABOLIC PANEL: CPT

## 2018-09-18 PROCEDURE — 36415 COLL VENOUS BLD VENIPUNCTURE: CPT

## 2018-09-18 PROCEDURE — 70551 MRI BRAIN STEM W/O DYE: CPT

## 2018-09-18 PROCEDURE — 97535 SELF CARE MNGMENT TRAINING: CPT

## 2018-09-18 PROCEDURE — A9579 GAD-BASE MR CONTRAST NOS,1ML: HCPCS | Performed by: INTERNAL MEDICINE

## 2018-09-18 PROCEDURE — G8987 SELF CARE CURRENT STATUS: HCPCS

## 2018-09-18 PROCEDURE — 6360000004 HC RX CONTRAST MEDICATION: Performed by: INTERNAL MEDICINE

## 2018-09-18 PROCEDURE — 2580000003 HC RX 258: Performed by: INTERNAL MEDICINE

## 2018-09-18 PROCEDURE — 1200000003 HC TELEMETRY R&B

## 2018-09-18 PROCEDURE — 6360000002 HC RX W HCPCS: Performed by: INTERNAL MEDICINE

## 2018-09-18 PROCEDURE — 72156 MRI NECK SPINE W/O & W/DYE: CPT

## 2018-09-18 PROCEDURE — 97166 OT EVAL MOD COMPLEX 45 MIN: CPT

## 2018-09-18 PROCEDURE — 6370000000 HC RX 637 (ALT 250 FOR IP): Performed by: INTERNAL MEDICINE

## 2018-09-18 PROCEDURE — G8988 SELF CARE GOAL STATUS: HCPCS

## 2018-09-18 PROCEDURE — 85027 COMPLETE CBC AUTOMATED: CPT

## 2018-09-18 PROCEDURE — 71045 X-RAY EXAM CHEST 1 VIEW: CPT

## 2018-09-18 RX ORDER — AMLODIPINE BESYLATE 5 MG/1
5 TABLET ORAL ONCE
Status: COMPLETED | OUTPATIENT
Start: 2018-09-18 | End: 2018-09-18

## 2018-09-18 RX ORDER — AMLODIPINE BESYLATE 10 MG/1
10 TABLET ORAL DAILY
Status: DISCONTINUED | OUTPATIENT
Start: 2018-09-19 | End: 2018-09-21 | Stop reason: HOSPADM

## 2018-09-18 RX ORDER — MEGESTROL ACETATE 40 MG/ML
200 SUSPENSION ORAL 2 TIMES DAILY
Status: DISCONTINUED | OUTPATIENT
Start: 2018-09-18 | End: 2018-09-21

## 2018-09-18 RX ADMIN — METOPROLOL TARTRATE 50 MG: 50 TABLET, FILM COATED ORAL at 21:29

## 2018-09-18 RX ADMIN — AZITHROMYCIN 250 MG: 250 TABLET, FILM COATED ORAL at 10:12

## 2018-09-18 RX ADMIN — AMLODIPINE BESYLATE 5 MG: 5 TABLET ORAL at 10:12

## 2018-09-18 RX ADMIN — GADOTERIDOL 10 ML: 279.3 INJECTION, SOLUTION INTRAVENOUS at 17:19

## 2018-09-18 RX ADMIN — Medication 10 ML: at 21:48

## 2018-09-18 RX ADMIN — METOPROLOL TARTRATE 50 MG: 50 TABLET, FILM COATED ORAL at 10:12

## 2018-09-18 RX ADMIN — ACETAMINOPHEN 650 MG: 325 TABLET ORAL at 00:13

## 2018-09-18 RX ADMIN — MEGESTROL ACETATE 200 MG: 40 SUSPENSION ORAL at 21:26

## 2018-09-18 RX ADMIN — ENOXAPARIN SODIUM 40 MG: 40 INJECTION SUBCUTANEOUS at 10:17

## 2018-09-18 RX ADMIN — ACETAMINOPHEN 650 MG: 325 TABLET ORAL at 18:42

## 2018-09-18 RX ADMIN — AMLODIPINE BESYLATE 5 MG: 5 TABLET ORAL at 15:48

## 2018-09-18 RX ADMIN — CEFTRIAXONE SODIUM 1 G: 1 INJECTION, POWDER, FOR SOLUTION INTRAMUSCULAR; INTRAVENOUS at 18:42

## 2018-09-18 RX ADMIN — ACETAMINOPHEN 650 MG: 325 TABLET ORAL at 10:17

## 2018-09-18 ASSESSMENT — PAIN SCALES - GENERAL
PAINLEVEL_OUTOF10: 0
PAINLEVEL_OUTOF10: 6
PAINLEVEL_OUTOF10: 0

## 2018-09-18 NOTE — PROGRESS NOTES
of Home: House  Home Layout: One level  Home Access: Stairs to enter without rails  Entrance Stairs - Number of Steps: 2  Home Equipment:  (none used PTA)     Bathroom Shower/Tub: Tub/Shower unit  Bathroom Toilet: Standard  Bathroom Equipment:  (none)  Bathroom Accessibility: Accessible       ADL Assistance: Independent  Homemaking Assistance: Independent ( does yardwork, ind with all other)       Ambulation Assistance: Independent  Transfer Assistance: Independent    Active : Yes     Additional Comments: Pt reports very ind at PLOF, ambulates without device    Objective        Overall Cognitive Status: Exceptions  Arousal/Alertness: Delayed responses to stimuli  Following Commands: Follows one step commands with increased time, Follows one step commands with repetition  Attention Span: Attends with cues to redirect  Insights: Decreased awareness of deficits  Initiation: Requires cues for some         Sensation  Overall Sensation Status: WNL                           LUE AROM (degrees)  LUE AROM : WFL          RUE AROM (degrees)  RUE AROM : WFL       LUE Strength  Gross LUE Strength: WFL  LUE Strength Comment: generalized weakness noted                RUE Strength  Gross RUE Strength: WFL  RUE Strength Comment: generalized weakness noted                                             ADL  Grooming: Increased time to complete, Minimal assistance (pt requires Min A for standing balance at sink, pt clumsy demo difficutly with bilateral coordingation and [facility] for oral hygiene and required cues to stay on task)  LE Dressing:  Moderate assistance, Increased time to complete, Contact guard assistance (able to bertha socks at EOB with CGA, Mod A for threading BLE into briefs seated on toilet )  Toileting: Contact guard assistance, Increased time to complete (Mod A for clothing management)     Bed mobility  Supine to Sit: Contact guard assistance (inc time, sues and HOB elevated)  Scooting: Contact guard assistance (EOB)    Transfers  Sit to stand: Contact guard assistance  Stand to sit: Contact guard assistance  Transfer Comments: cues for safe approach and hand palceent  Toilet Transfers  Toilet - Technique: Ambulating  Equipment Used: Standard toilet  Toilet Transfer: Contact guard assistance  Toilet Transfers Comments: cues for safety    Balance  Sitting Balance: Contact guard assistance  Standing Balance: Minimal assistance     Time: 2 min, 6 min  Activity: ADLs     Functional Mobility  Functional - Mobility Device: Rolling Walker  Activity: To/from bathroom  Assist Level: Minimal assistance  Functional Mobility Comments: requires hands on assist for safety and RW progression, pt unsteady, no LOB                                                         Activity Tolerance:  Activity Tolerance: Patient limited by fatigue  Activity Tolerance: Treatmnet initiated: OT eval completed, see assessment. Pt demonstrated fatigue with simple ADLs and limited functional mobility, requiring further OT intervention        Assessment:  Assessment: Pt would cont to benefit from skilled OT intervention for maximizing pt safety and independence with self care tasks and functional mobility  Performance deficits / Impairments: Decreased functional mobility , Decreased ADL status, Decreased strength, Decreased safe awareness, Decreased balance, Decreased endurance  Prognosis: Good  Discharge Recommendations: Continue to assess pending progress, Patient would benefit from continued therapy after discharge    Clinical Decision Making: Clinical Decision making was of Moderate Complexity as the result of analysis of data from a detailed assessment, a consideration of several treatment options, the presence of comorbidities affecting the plan of care and the need for minimal to moderate modifications or assistance required to complete the evaluation.     Patient Education:  Patient Education: role of OT, poc/goals, safety, t/f training, ADLs  Barriers to Learning: cognition? Equipment Recommendations: Other: cont to monitor    Safety:  Safety Devices in place: Yes  Type of devices: All fall risk precautions in place, Left in chair, Call light within reach, Nurse notified, Chair alarm in place, Gait belt, Patient at risk for falls    Plan:  Times per week: 5x  Current Treatment Recommendations: Strengthening, Balance Training, Functional Mobility Training, Endurance Training, Safety Education & Training, Self-Care / ADL, Patient/Caregiver Education & Training, Home Management Training, Equipment Evaluation, Education, & procurement    Goals:  Patient goals : To go home    Short term goals  Time Frame for Short term goals: two weeks  Short term goal 1: pt to demonstrate dynamic standing balance x7 mins iwth 1-2 hand release at SBA for inc ind with bathing tasks  Short term goal 2: pt to complete functional mobility at [facility] distances with LRAD at [facility] for inc ind with accessing environment   Short term goal 3: pt to complete LB ADLs using LHAE prn with no greater than CGA for inc ind with self cares  Long term goals  Time Frame for Long term goals : NA d/t ELOS    Evaluation Complexity: Based on the findings of patient history, examination, clinical presentation, and decision making during this evaluation, this patient is of medium complexity. OT G-codes  Functional Assessment Tool Used: [facility]  Score: 15  Functional Limitation: Self care  Self Care Current Status (): At least 40 percent but less than 60 percent impaired, limited or restricted  Self Care Goal Status ():  At least 20 percent but less than 40 percent impaired, limited or restricted  AM-PAC Inpatient Daily Activity Raw Score: 15  AM-PAC Inpatient ADL T-Scale Score : 34.69  ADL Inpatient CMS 0-100% Score: 56.46  ADL Inpatient CMS G-Code Modifier : CK

## 2018-09-18 NOTE — PROGRESS NOTES
INTERNAL MEDICINE Progress Note  9/18/2018 11:30 AM  Subjective:   Admit Date: 9/15/2018  PCP: Duane Carina, MD  Interval History:   Weak, poor appetite  Low grade fevers, cough  Diarrhea, on imodium. Objective:   Vitals: BP (!) 180/88   Pulse 72   Temp 99 °F (37.2 °C) (Oral)   Resp 22   Ht 5' 1\" (1.549 m)   Wt 122 lb (55.3 kg)   SpO2 95%   BMI 23.05 kg/m²   General appearance: alert and cooperative with exam, looks weak  HEENT:  atraumatic, ears-NAD  Neck:  no JVD  Lungs: diminished breath sounds bilaterally  Heart: S1, S2 normal  Abdomen: soft, non-tender; bowel sounds normal; no masses,  no organomegaly  Extremities: no edema,   Neurologic:  Alert, oriented, moves all 4 extremities      Medications:   Scheduled Meds:   potassium replacement protocol   Other RX Placeholder    nicotine  1 patch Transdermal Daily    metoprolol tartrate  50 mg Oral BID    amLODIPine  5 mg Oral Daily    sodium chloride flush  10 mL Intravenous 2 times per day    enoxaparin  40 mg Subcutaneous Daily    cefTRIAXone (ROCEPHIN) IV  1 g Intravenous Q24H    azithromycin  250 mg Oral Daily     Continuous Infusions:   sodium chloride 75 mL/hr at 09/17/18 2005       Lab Results:   CBC:   Recent Labs      09/16/18   0553  09/17/18   0609  09/18/18   0542   WBC  13.7*  14.0*  10.0   HGB  11.9*  10.9*  10.9*   PLT  143  135  159     BMP:    Recent Labs      09/16/18   0711  09/17/18   0609  09/18/18   0542   NA  139  137  139   K  3.1*  3.5  3.6   CL  105  104  104   CO2  22*  25  26   BUN   --   9  8   CREATININE   --   0.7  0.7   GLUCOSE   --   100  98     Hepatic:   Recent Labs      09/18/18   0542   AST  25   ALT  16   BILITOT  0.3   ALKPHOS  66     Magnesium:    Lab Results   Component Value Date    MG 1.7 09/16/2018     MRI brain:  1. No acute intracranial abnormality is identified.  Moderate patchy foci of hyperintense T2 signal are noted throughout the periventricular and deep cerebral white matter which likely correspond to sequela of chronic microvascular ischemic angiopathy. Small areas of encephalomalacia are noted also noted within the bilateral basal ganglia which likely corresponds to remote lacunar infarcts. No evidence to suggest acute ischemia. 2. There is a small focus of hypointense T1 signal at the right side of the cervical medullary junction. This is not further evaluated on the additional sequences and may be artifactual versus may represent an area of myelomalacia. An underlying lesion is not completely excluded. Further evaluation with a dedicated MRI of the cervical spine with and without contrast may be considered, if clinically warranted. CT ABDOMEN PELVIS W WO CONTRAST  1. Small sided pleural effusion. Additional chronic changes otherwise as above. No other acute intra-abdominal or pelvic abnormalities are seen allowing for limitations related to streak artifact at the level of the pelvis due to right hip arthroplasty   surgical hardware. Assessment and Plan:   1. Acute febrile illness. 2. Acute bronchitis  3. Acute hypoxemic resp failure  4. Syncope  5. HTN  6. Hypokalemia  7. Nicotine abuse  8. Diarrhea  9. Confusion / delirium  10. Cervicomedullary junction abn on MRI brain, get MRI C spine     Adjust bp meds  Cont antibiotics. Am labs. PT/OT.   MRI C spine    Renetta Rodriguez MD
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
## 2018-09-19 PROBLEM — R35.0 URINARY FREQUENCY: Status: ACTIVE | Noted: 2018-09-19

## 2018-09-19 PROBLEM — R68.89 FORGETFULNESS: Status: ACTIVE | Noted: 2018-09-19

## 2018-09-19 PROBLEM — R42 DIZZINESS: Status: ACTIVE | Noted: 2018-09-19

## 2018-09-19 PROBLEM — R19.7 DIARRHEA: Status: ACTIVE | Noted: 2018-09-19

## 2018-09-19 PROBLEM — R53.1 GENERALIZED WEAKNESS: Status: ACTIVE | Noted: 2018-09-19

## 2018-09-19 PROBLEM — G93.40 ACUTE ENCEPHALOPATHY: Status: ACTIVE | Noted: 2018-09-19
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
## 2018-09-19 PROBLEM — Z86.73 HISTORY OF CVA (CEREBROVASCULAR ACCIDENT): Status: ACTIVE | Noted: 2018-09-19

## 2018-09-19 PROBLEM — J20.9 ACUTE BRONCHITIS: Status: ACTIVE | Noted: 2018-09-19

## 2018-09-19 PROCEDURE — G8978 MOBILITY CURRENT STATUS: HCPCS

## 2018-09-19 PROCEDURE — 6370000000 HC RX 637 (ALT 250 FOR IP): Performed by: INTERNAL MEDICINE

## 2018-09-19 PROCEDURE — 99233 SBSQ HOSP IP/OBS HIGH 50: CPT | Performed by: INTERNAL MEDICINE

## 2018-09-19 PROCEDURE — 97161 PT EVAL LOW COMPLEX 20 MIN: CPT

## 2018-09-19 PROCEDURE — 2580000003 HC RX 258: Performed by: INTERNAL MEDICINE

## 2018-09-19 PROCEDURE — 92610 EVALUATE SWALLOWING FUNCTION: CPT

## 2018-09-19 PROCEDURE — 97110 THERAPEUTIC EXERCISES: CPT

## 2018-09-19 PROCEDURE — 1200000003 HC TELEMETRY R&B

## 2018-09-19 PROCEDURE — 6360000002 HC RX W HCPCS: Performed by: INTERNAL MEDICINE

## 2018-09-19 PROCEDURE — 97535 SELF CARE MNGMENT TRAINING: CPT

## 2018-09-19 PROCEDURE — 87205 SMEAR GRAM STAIN: CPT

## 2018-09-19 PROCEDURE — 009U3ZX DRAINAGE OF SPINAL CANAL, PERCUTANEOUS APPROACH, DIAGNOSTIC: ICD-10-PCS | Performed by: RADIOLOGY

## 2018-09-19 PROCEDURE — G8979 MOBILITY GOAL STATUS: HCPCS

## 2018-09-19 RX ORDER — SODIUM CHLORIDE 0.9 % (FLUSH) 0.9 %
10 SYRINGE (ML) INJECTION EVERY 12 HOURS SCHEDULED
Status: DISCONTINUED | OUTPATIENT
Start: 2018-09-19 | End: 2018-09-21 | Stop reason: HOSPADM

## 2018-09-19 RX ORDER — METHYLPREDNISOLONE SODIUM SUCCINATE 40 MG/ML
40 INJECTION, POWDER, LYOPHILIZED, FOR SOLUTION INTRAMUSCULAR; INTRAVENOUS DAILY
Status: DISCONTINUED | OUTPATIENT
Start: 2018-09-19 | End: 2018-09-20

## 2018-09-19 RX ORDER — HYDRALAZINE HYDROCHLORIDE 20 MG/ML
10 INJECTION INTRAMUSCULAR; INTRAVENOUS EVERY 6 HOURS PRN
Status: DISCONTINUED | OUTPATIENT
Start: 2018-09-19 | End: 2018-09-21

## 2018-09-19 RX ORDER — SODIUM CHLORIDE 0.9 % (FLUSH) 0.9 %
10 SYRINGE (ML) INJECTION PRN
Status: DISCONTINUED | OUTPATIENT
Start: 2018-09-19 | End: 2018-09-21 | Stop reason: HOSPADM

## 2018-09-19 RX ADMIN — CEFTRIAXONE SODIUM 1 G: 1 INJECTION, POWDER, FOR SOLUTION INTRAMUSCULAR; INTRAVENOUS at 18:27

## 2018-09-19 RX ADMIN — Medication 10 ML: at 08:50

## 2018-09-19 RX ADMIN — METOPROLOL TARTRATE 50 MG: 50 TABLET, FILM COATED ORAL at 08:38

## 2018-09-19 RX ADMIN — MEGESTROL ACETATE 200 MG: 40 SUSPENSION ORAL at 08:38

## 2018-09-19 RX ADMIN — METOPROLOL TARTRATE 50 MG: 50 TABLET, FILM COATED ORAL at 21:31

## 2018-09-19 RX ADMIN — MEGESTROL ACETATE 200 MG: 40 SUSPENSION ORAL at 21:32

## 2018-09-19 RX ADMIN — AMLODIPINE BESYLATE 10 MG: 10 TABLET ORAL at 08:38

## 2018-09-19 RX ADMIN — AZITHROMYCIN 250 MG: 250 TABLET, FILM COATED ORAL at 08:38

## 2018-09-19 RX ADMIN — METHYLPREDNISOLONE SODIUM SUCCINATE 40 MG: 40 INJECTION, POWDER, FOR SOLUTION INTRAMUSCULAR; INTRAVENOUS at 18:27

## 2018-09-19 RX ADMIN — Medication 10 ML: at 21:32

## 2018-09-19 RX ADMIN — ENOXAPARIN SODIUM 40 MG: 40 INJECTION SUBCUTANEOUS at 08:38

## 2018-09-19 RX ADMIN — LOPERAMIDE HYDROCHLORIDE 2 MG: 2 CAPSULE ORAL at 08:38

## 2018-09-19 NOTE — PROGRESS NOTES
[address] [address]  SPEECH THERAPY  [facility] RENAL TELEMETRY 6K  Bedside Swallowing Evaluation      SLP Individual Minutes  Time In: 2654  Time Out: 1213  Minutes: 8          Date: 2018  Patient Name: Lindy Tate      CSN: [id number]   : 1945  (81 y.o.)  Gender: female   Referring Physician:  Dr. Calin Ferrell   Diagnosis: Fever, urinary frequency, cough, fall  Secondary Diagnosis:  Dysphagia   History of Present Illness/Injury: The patient is a 81 y.o. female who presents with fever and generalized malaise. The patient has a two week history of a fever, generalized malaise, urinary frequency, nonproductive cough, and decreased appetite. The patient states that her symptoms have been persistent and unchanged since onset. She denies any abdominal pain, chest pain, nausea, vomiting, diarrhea, constipation, or other urinary symptoms. She reports dizziness with a fall at home. ST to assess swallowing function and determine least restrictive diet. Past Medical History:   Diagnosis Date    Arthritis     Back ache        Pain:  Did not state. Current Diet: Regular and thin.      Respiratory Status: [x] Independent [] Nasal Cannula [] Oxygen Mask      [] Tracheostomy [] Other:     [] Ventilator/Settings:    Behavioral Observation: [x] Alert [x] Oriented [] Confused [] Lethargic      [] Dysarthric [] Limited Direction Following [] Agitated      [] Other:    ORAL MECHANISM EVALUATION:         Comments:  Facial / Labial [x]WFL [] Impaired []DNT    Lingual [x]WFL [] Impaired []DNT    Dentition [x]WFL [] Impaired []DNT    Velum []WFL [] Impaired [x]DNT    Vocal Quality []WFL [x] Impaired []DNT Hoarse    Sensation []WFL [] Impaired [x]DNT    Cough []WFL [] Impaired [x]DNT    Other: []WFL [] Impaired []DNT    Other: []WFL [] Impaired []DNT        PATIENT WAS EVALUATED USING: ice chips, applesauce, anthony cracker, thin liquids     ORAL PHASE: [x] WFL  [] Impaired   [] Loss of bolus from lips [] Impaired AP movement [] Pocketing Left   [] Pocketing Right  [] Lingual Pumping  [] Impaired Mastication   [] Reduced Bolus Formation [] Impaired Oral Initiation    [] OTHER:    PHARYNGEAL PHASE: [x] WFL: Pharyngeal phase appears WFLs, but cannot completely rule out pharyngeal phase deficits from a bedside swallow evaluation alone. [] Impaired   [] Delayed Swallow  [] Decreased Hyolaryngeal Elevation [] Audible Swallow   [] Suspected Pharyngeal Residue due to spontaneous multiple swallow. [] OTHER:    SIGNS AND SYMPTOMS OF LARYNGEAL PENETRATION / ASPIRATION:  [x] NO sign/symptoms of aspiration evident with this evaluation, but cannot rule out silent aspiration. [] Throat Clear  [] Immediate Cough [] Delayed Cough [] Wet Vocal Quality  [] Change in Pulmonary Status  [] OTHER:    IMPRESSIONS: Pt presents with swallowing function WFL. No overt s/s of aspiration noted during po trials of liquids or solids. Within RN stating, pt shows signs of impulsivity was eating quickly and taking big bites. Pt is at risk for aspiration if this continues. Recommend speech services to monitor current diet and complete full cognitive evaluation. RECOMMENDATIONS:     Modified Barium Swallow: [] Is indicated to further assess    [x] Is NOT indicated at this time; Will recommend as appropriate. DIET RECOMMENDATIONS:  Regular and thin. STRATEGIES: [] Strategies pending MBS results.   [x] Full upright position  [x] Small bite/sip [] No Straw [] Multiple Swallow  [] Chin tuck [] Head turn [] Pulmonary monitoring [] Oral care after all meals  [] Supervision  [] Medication in applesauce []Direct 1:1 Supervision  [] Spoon all liquids [] Alternate solid / liquid [] Limit distractions [x] Monitor for fatigue  [] PMV in place for all po [] OTHER:      EDUCATION:   Learner: [x]Patient [] Significant other [] Son/Daughter [] Parent     [x] Other: Multiple family members present    Education: [x] Reviewed results and recommendations of this

## 2018-09-19 NOTE — PROGRESS NOTES
skilled PT to address safety with mobility and strengthening. Prognosis: Excellent    Clinical Presentation: Low - Stable and Uncomplicated:      Decision Making: Moderate Complexity based on patient assessment and decision making process of determining plan of care and establishing reasonable expectations for measurable functional outcomes    REQUIRES PT FOLLOW UP: Yes  Discharge Recommendations: Continue to assess pending progress    Patient Education:  Patient Education: plan of care and LE exercises    Equipment Recommendations: Other: monitor for needs    Safety:  Type of devices: All fall risk precautions in place, Gait belt, Bed alarm in place, Call light within reach, Patient at risk for falls, Left in bed, Nurse notified    Plan:  Times per week: 5x GM  Times per day: Daily  Specific instructions for Next Treatment: ther ex, ther act, gait trng, endurance building  Current Treatment Recommendations: Strengthening, Functional Mobility Training, Transfer Training, Balance Training, Endurance Training, Gait Training, Stair training, Patient/Caregiver Education & Training, Safety Education & Training, Home Exercise Program, Equipment Evaluation, Education, & procurement    Goals:  Patient goals : go home  Short term goals  Time Frame for Short term goals: 3 days  Short term goal 1: Pt to be Mod I for supine <> sit to get in/out of bed  Short term goal 2: Pt to be Mod I for sit <> stand to get up to ambulate  Short term goal 3: Pt to ambulate > 80 ft with/without RW with Supervision for household distances  Short term goal 4: Pt to negotiate 2 small steps with no rail with SBA for home access  Long term goals  Time Frame for Long term goals : not set due to short ELOS    Evaluation Complexity: Based on the findings of patient history, examination, clinical presentation, and decision making during this evaluation, the evaluation of Elvira Alanis is of low complexity.     PT G-Codes  Functional Assessment Tool Used: [facility]  Functional Limitation: Mobility: Walking and moving around  Mobility: Walking and Moving Around Current Status (): At least 40 percent but less than 60 percent impaired, limited or restricted  Mobility: Walking and Moving Around Goal Status (): At least 40 percent but less than 60 percent impaired, limited or restricted       AM-PAC Inpatient Mobility without Stair Climbing Raw Score : 15  AM-PAC Inpatient without Stair Climbing T-Scale Score : 43.03  Mobility Inpatient CMS 0-100% Score: 47.43  Mobility Inpatient without Stair CMS G-Code Modifier : Em[facility] [place] 8

## 2018-09-20 ENCOUNTER — APPOINTMENT (OUTPATIENT)
Dept: GENERAL RADIOLOGY | Age: 73
DRG: 202 | End: 2018-09-20
Payer: MEDICARE

## 2018-09-20 LAB
ALBUMIN SERPL-MCNC: 3.3 G/DL (ref 3.5–5.1)
ALP BLD-CCNC: 66 U/L (ref 38–126)
ALT SERPL-CCNC: 16 U/L (ref 11–66)
ANION GAP SERPL CALCULATED.3IONS-SCNC: 12 MEQ/L (ref 8–16)
AST SERPL-CCNC: 23 U/L (ref 5–40)
BILIRUB SERPL-MCNC: 0.7 MG/DL (ref 0.3–1.2)
BLOOD CULTURE, ROUTINE: NORMAL
BLOOD CULTURE, ROUTINE: NORMAL
BUN BLDV-MCNC: 9 MG/DL (ref 7–22)
CALCIUM SERPL-MCNC: 8.7 MG/DL (ref 8.5–10.5)
CHARACTER, CSF: CLEAR
CHLORIDE BLD-SCNC: 100 MEQ/L (ref 98–111)
CHOLESTEROL, TOTAL: 161 MG/DL (ref 100–199)
CO2: 26 MEQ/L (ref 23–33)
COLOR CSF: COLORLESS
CREAT SERPL-MCNC: 0.5 MG/DL (ref 0.4–1.2)
CRYPTOCOCCUS NEOFORMANS/GATTI CSF FILM ARR.: NOT DETECTED
CYTOMEGALOVIRUS (CMV) CSF FILM ARRAY: NOT DETECTED
ENTEROVIRUS DETECTION PCR: NOT DETECTED
ESCHERICHIA COLI K1 CSF FILM ARRAY: NOT DETECTED
FOLATE: 11.8 NG/ML (ref 4.8–24.2)
GFR SERPL CREATININE-BSD FRML MDRD: > 90 ML/MIN/1.73M2
GLUCOSE BLD-MCNC: 140 MG/DL (ref 70–108)
GLUCOSE, CSF: 72 MG/DL (ref 40–80)
HAEMOPHILUS INFLUENZA CSF FILM ARRAY: NOT DETECTED
HDLC SERPL-MCNC: 37 MG/DL
HHV-6 (HERPESVIRUS 6) CSF FILM ARRAY: NOT DETECTED
HSV-1 CSF FILM ARRAY: NOT DETECTED
HSV-2 CSF FILM ARRAY: NOT DETECTED
LDL CHOLESTEROL CALCULATED: 109 MG/DL
LISTERIA MONOCYTOGENES CSF FILM ARRAY: NOT DETECTED
LYMPHS CSF: 77 % (ref 0–90)
MAGNESIUM: 1.9 MG/DL (ref 1.6–2.4)
MONOCYTE, CSF: 21 % (ref 0–45)
NEISSERIA MENIGITIDIS CSF FILM ARRAY: NOT DETECTED
PARECHOVIRUS CSF FILM ARRAY: NOT DETECTED
PATHOLOGIST REVIEW: ABNORMAL
PHOSPHORUS: 2.8 MG/DL (ref 2.4–4.7)
POTASSIUM SERPL-SCNC: 3.5 MEQ/L (ref 3.5–5.2)
PROTEIN CSF: 64 MG/DL (ref 12–60)
RBC CSF: 35 /CUMM
SEGS, CSF: 2 % (ref 0–6)
SODIUM BLD-SCNC: 138 MEQ/L (ref 135–145)
STREPTOCOCCUS AGALACTIAE CSF FILM ARRAY: NOT DETECTED
STREPTOCOCCUS PNEUMONIAE CSF FILM ARRAY: NOT DETECTED
TOTAL NUCLEATED CELLS CSF: 20 /CUMM (ref 0–5)
TOTAL PROTEIN: 6.6 G/DL (ref 6.1–8)
TRIGL SERPL-MCNC: 75 MG/DL (ref 0–199)
TUBE VOLUME RECEIVED CSF: 13 ML
VARICELLA-ZOSTER, PCR: NOT DETECTED
VITAMIN B-12: 506 PG/ML (ref 211–911)

## 2018-09-20 PROCEDURE — 88112 CYTOPATH CELL ENHANCE TECH: CPT

## 2018-09-20 PROCEDURE — 89051 BODY FLUID CELL COUNT: CPT

## 2018-09-20 PROCEDURE — 83916 OLIGOCLONAL BANDS: CPT

## 2018-09-20 PROCEDURE — 6370000000 HC RX 637 (ALT 250 FOR IP): Performed by: INTERNAL MEDICINE

## 2018-09-20 PROCEDURE — 84157 ASSAY OF PROTEIN OTHER: CPT

## 2018-09-20 PROCEDURE — 84100 ASSAY OF PHOSPHORUS: CPT

## 2018-09-20 PROCEDURE — 82746 ASSAY OF FOLIC ACID SERUM: CPT

## 2018-09-20 PROCEDURE — 86788 WEST NILE VIRUS AB IGM: CPT

## 2018-09-20 PROCEDURE — 82040 ASSAY OF SERUM ALBUMIN: CPT

## 2018-09-20 PROCEDURE — 97110 THERAPEUTIC EXERCISES: CPT

## 2018-09-20 PROCEDURE — 6360000002 HC RX W HCPCS: Performed by: INTERNAL MEDICINE

## 2018-09-20 PROCEDURE — 2580000003 HC RX 258: Performed by: INTERNAL MEDICINE

## 2018-09-20 PROCEDURE — 82945 GLUCOSE OTHER FLUID: CPT

## 2018-09-20 PROCEDURE — 82607 VITAMIN B-12: CPT

## 2018-09-20 PROCEDURE — 83873 ASSAY OF CSF PROTEIN: CPT

## 2018-09-20 PROCEDURE — 87798 DETECT AGENT NOS DNA AMP: CPT

## 2018-09-20 PROCEDURE — 83735 ASSAY OF MAGNESIUM: CPT

## 2018-09-20 PROCEDURE — 80053 COMPREHEN METABOLIC PANEL: CPT

## 2018-09-20 PROCEDURE — 86618 LYME DISEASE ANTIBODY: CPT

## 2018-09-20 PROCEDURE — 36415 COLL VENOUS BLD VENIPUNCTURE: CPT

## 2018-09-20 PROCEDURE — 82042 OTHER SOURCE ALBUMIN QUAN EA: CPT

## 2018-09-20 PROCEDURE — 86789 WEST NILE VIRUS ANTIBODY: CPT

## 2018-09-20 PROCEDURE — 62270 DX LMBR SPI PNXR: CPT

## 2018-09-20 PROCEDURE — 77003 FLUOROGUIDE FOR SPINE INJECT: CPT

## 2018-09-20 PROCEDURE — 87075 CULTR BACTERIA EXCEPT BLOOD: CPT

## 2018-09-20 PROCEDURE — 82784 ASSAY IGA/IGD/IGG/IGM EACH: CPT

## 2018-09-20 PROCEDURE — 1200000003 HC TELEMETRY R&B

## 2018-09-20 PROCEDURE — 87529 HSV DNA AMP PROBE: CPT

## 2018-09-20 PROCEDURE — 97116 GAIT TRAINING THERAPY: CPT

## 2018-09-20 PROCEDURE — 80061 LIPID PANEL: CPT

## 2018-09-20 PROCEDURE — 99222 1ST HOSP IP/OBS MODERATE 55: CPT | Performed by: PSYCHIATRY & NEUROLOGY

## 2018-09-20 PROCEDURE — 86592 SYPHILIS TEST NON-TREP QUAL: CPT

## 2018-09-20 PROCEDURE — 97535 SELF CARE MNGMENT TRAINING: CPT

## 2018-09-20 PROCEDURE — 99233 SBSQ HOSP IP/OBS HIGH 50: CPT | Performed by: INTERNAL MEDICINE

## 2018-09-20 RX ORDER — ASPIRIN 81 MG/1
81 TABLET ORAL DAILY
Status: DISCONTINUED | OUTPATIENT
Start: 2018-09-21 | End: 2018-09-21 | Stop reason: HOSPADM

## 2018-09-20 RX ORDER — ATORVASTATIN CALCIUM 20 MG/1
20 TABLET, FILM COATED ORAL NIGHTLY
Status: DISCONTINUED | OUTPATIENT
Start: 2018-09-20 | End: 2018-09-21 | Stop reason: HOSPADM

## 2018-09-20 RX ORDER — PREDNISONE 20 MG/1
40 TABLET ORAL DAILY
Status: DISCONTINUED | OUTPATIENT
Start: 2018-09-21 | End: 2018-09-21 | Stop reason: HOSPADM

## 2018-09-20 RX ORDER — ACETAMINOPHEN 500 MG
1000 TABLET ORAL EVERY 6 HOURS PRN
Status: DISCONTINUED | OUTPATIENT
Start: 2018-09-20 | End: 2018-09-21

## 2018-09-20 RX ADMIN — CEFTRIAXONE SODIUM 1 G: 1 INJECTION, POWDER, FOR SOLUTION INTRAMUSCULAR; INTRAVENOUS at 18:35

## 2018-09-20 RX ADMIN — MEGESTROL ACETATE 200 MG: 40 SUSPENSION ORAL at 11:39

## 2018-09-20 RX ADMIN — METOPROLOL TARTRATE 50 MG: 50 TABLET, FILM COATED ORAL at 11:11

## 2018-09-20 RX ADMIN — ATORVASTATIN CALCIUM 20 MG: 20 TABLET, FILM COATED ORAL at 21:49

## 2018-09-20 RX ADMIN — AMLODIPINE BESYLATE 10 MG: 10 TABLET ORAL at 11:11

## 2018-09-20 RX ADMIN — METOPROLOL TARTRATE 50 MG: 50 TABLET, FILM COATED ORAL at 21:49

## 2018-09-20 RX ADMIN — Medication 10 ML: at 21:49

## 2018-09-20 RX ADMIN — MEGESTROL ACETATE 200 MG: 40 SUSPENSION ORAL at 21:49

## 2018-09-20 ASSESSMENT — PAIN SCALES - GENERAL
PAINLEVEL_OUTOF10: 0
PAINLEVEL_OUTOF10: 0

## 2018-09-20 NOTE — PLAN OF CARE
reviewed with patient. Patient and family verbalize understanding of the plan of care and contribute to goal setting.

## 2018-09-20 NOTE — CONSULTS
Inpatient consult to Neurology  Consult performed by: Thuy Richard  Consult ordered by: Radu Farr NOTE      Requesting Physician: Yolanda Benton MD    Reason for Consult:  Evaluate for AMS and lightheadedness    History of Present Illness:  Rupesh Shabazz is a 81 y.o. female admitted to [address] [place] [address] on 9/15/2018.       81year old woman with no significant history, c/o lightheadedness, increase urinary frequency for the last 3 weeks. Family noted that she became confused, speech was stuttering, and easily fall down in the last 2 weeks. Reports no chest pain. No shortness of breath with exertion. Reports no neck pain. No vision changes. No dysphagia. No fever. No rash. No weight loss.     Past Medical History:        Diagnosis Date    Back ache     History of arthritis     Tobacco use disorder            Procedure Laterality Date    JOINT REPLACEMENT Right        Allergies:    No Known Allergies     Current Medications:     atorvastatin (LIPITOR) tablet 20 mg Nightly   sodium chloride flush 0.9 % injection 10 mL 2 times per day   sodium chloride flush 0.9 % injection 10 mL PRN   hydrALAZINE (APRESOLINE) injection 10 mg Q6H PRN   methylPREDNISolone sodium (SOLU-MEDROL) injection 40 mg Daily   megestrol (MEGACE) 40 MG/ML suspension 200 mg BID   amLODIPine (NORVASC) tablet 10 mg Daily   loperamide (IMODIUM) capsule 2 mg 4x Daily PRN   potassium replacement protocol RX Placeholder   nicotine (NICODERM CQ) 21 MG/24HR 1 patch Daily   metoprolol tartrate (LOPRESSOR) tablet 50 mg BID   acetaminophen (TYLENOL) tablet 650 mg Q4H PRN   cefTRIAXone (ROCEPHIN) 1 g IVPB in 50 mL D5W minibag Q24H        Social History:  History   Smoking Status    Current Every Day Smoker    Packs/day: 0.50    Types: Cigarettes   Smokeless Tobacco    Never Used     History   Alcohol Use No     History   Drug Use No     Single    Family History:   Family History   Problem Relation Age of pulses are normal.   Musculoskeletal: Haso hand arthritis, no limitation of ROM in any of the four extremities,  There is noleg edema. The Heart was regular in rate and rhythm. No heart murmur  Chest- Clear   Abdomen: Soft, Intact bowel sounds. Labs:    CBC: Recent Labs      09/18/18   0542   WBC  10.0   HGB  10.9*   PLT  159   MCV  96.4   MCH  32.9   MCHC  34.2     CMP:  Recent Labs      09/18/18   0542  09/20/18   0507   NA  139  138   K  3.6  3.5   CL  104  100   CO2  26  26   BUN  8  9   CREATININE  0.7  0.5   LABGLOM  82*  >90   GLUCOSE  98  140*   CALCIUM  8.4*  8.7     Liver: Recent Labs      09/20/18   0507   AST  23   ALT  16   ALKPHOS  66   PROT  6.6   LABALBU  3.3*   BILITOT  0.7     MRI BRAIN:  No results found for this or any previous visit. No results found for this or any previous visit. No results found for this or any previous visit. No results found for this or any previous visit. Results for orders placed during the hospital encounter of 09/15/18   MRI Brain WO Contrast    Narrative PROCEDURE: MRI BRAIN WO CONTRAST    CLINICAL INFORMATION: CONFUSION/DELIRIUM, ALTERED LOC, UNEXPLAINED. COMPARISON: None available. Correlation is made to CT of the head dated September 15, 2018    TECHNIQUE: Multiplanar and multiple spin echo MRI images were obtained of the brain without contrast. Sequences were repeated secondary to patient motion artifacts. The repeated images are also degraded by motion. FINDINGS:  The images are degraded by motion artifact which limits detailed evaluation. There is prominence of the sulcal spaces and ventricular system secondary to generalized, age-related parenchymal volume loss. Moderate patchy foci of hyperintense T2 signal are present throughout the periventricular and deep cerebral white matter which   likely correspond to sequela of chronic microvascular ischemic change. Similar findings are also noted within the humza.  Small areas of encephalomalacia are matter which likely   correspond to sequela of chronic microvascular ischemic change. No intracranial hemorrhage is seen. No mass, mass effect or extra-axial fluid collection is identified. The ventricles are midline without evidence of hydrocephalus. The basal cisterns are   patent. Atherosclerotic vascular calcifications are present at the skull base. The visualized orbits, temporal bone structures and paranasal sinuses are unremarkable. The calvarium is intact without acute fracture or aggressive, bony destructive process. Impression  No acute intracranial abnormality is identified. Small foci of low-attenuation are noted within the bilateral basal ganglia which likely correspond to age indeterminant lacunar infarcts. Sequela of chronic microvascular ischemic change are also noted   within the periventricular and deep cerebral white matter. If there is clinical concern for acute ischemia, MRI is more sensitive and may be considered. **This report has been created using voice recognition software. It may contain minor errors which are inherent in voice recognition technology. **    Final report electronically signed by Dr. Christos Ford on 9/15/2018 9:45 AM         We reviewed the patient records and available information in the EHR       Impression:    Principal Problem:    Acute febrile illness  Active Problems:    History of arthritis    Acute bronchitis    Fall at home    Urinary frequency    Generalized weakness    Tobacco use disorder    Dizziness    Acute encephalopathy, unspecified    Diarrhea    History of CVA (cerebrovascular accident)    Forgetfulness  Resolved Problems:    * No resolved hospital problems. *    67 year old woman with mental status changes, urinary frequency and falls for the last 3 weeks.     Recommendations:  - MRI brain and spine do not show any acute process, all were chronic in nature and is age appropriate  - given her symptoms, it looks like a global metabolic

## 2018-09-20 NOTE — PROGRESS NOTES
Impaired  Orientation Level: Oriented to person;Oriented to place; Disoriented to time;Oriented to situation         Pain:  Pain Assessment  Patient Currently in Pain: Denies       Objective  Arousal/Alertness: Appropriate responses to stimuli  Following Commands: Follows one step commands consistently  Safety Judgement: Decreased awareness of need for safety;Decreased awareness of need for assistance  Cognition Comment: pt continues with slowed processing and occasional word finding difficulty however noted improvement this date with level of alertness and response time with simple commands                                          ADL  Feeding: Stand by assistance; Increased time to complete; Adaptive utensils (comment) (pt offered built up handle for use of fork with pt reporting inc ease with use)  Additional Comments: pt was prepped for ADLs in shower with aquaguards placed when MD entered for assessment and transport arrived to take pt to lumbar puncture, unable to complete BADL session               Transfers  Sit to stand: Contact guard assistance  Stand to sit: Contact guard assistance       Balance  Sitting Balance: Supervision  Standing Balance: Minimal assistance (BUE supported)     Time: 1 min  Activity: prep for mobility     Functional Mobility  Functional - Mobility Device: No device (BUE supported)  Activity: Other  Assist Level: Minimal assistance  Functional Mobility Comments: Pt walking forward and backward from recliner for MD assessment, unsteady, pt benefits from use of AD           Activity Tolerance:  Activity Tolerance: Patient Tolerated treatment well  Activity Tolerance: Treatment limited this date 2/2 MD and transport arrival for lumbar puncture. Pt was prepped for ADLS in shower however unable to complete BADL session. Pt did demonstrate increased ease and ind with self feeding task with use of built up handle.     Assessment:  Assessment: Pt would cont to benefit from skilled OT

## 2018-09-20 NOTE — PROGRESS NOTES
predniSONE  40 mg Oral Daily    [START ON 9/21/2018] aspirin  81 mg Oral Daily    sodium chloride flush  10 mL Intravenous 2 times per day    megestrol  200 mg Oral BID    amLODIPine  10 mg Oral Daily    potassium replacement protocol   Other RX Placeholder    nicotine  1 patch Transdermal Daily    metoprolol tartrate  50 mg Oral BID    cefTRIAXone (ROCEPHIN) IV  1 g Intravenous Q24H       Infusions:       PRN Meds: acetaminophen, sodium chloride flush, hydrALAZINE, loperamide, acetaminophen    Labs/imaging:  CBC:   Recent Labs      09/18/18   0542   WBC  10.0   HGB  10.9*   PLT  159         BMP:    Recent Labs      09/18/18   0542  09/20/18   0507   NA  139  138   K  3.6  3.5   CL  104  100   CO2  26  26   BUN  8  9   CREATININE  0.7  0.5   GLUCOSE  98  140*         Hepatic:   Recent Labs      09/18/18   0542  09/20/18   0507   AST  25  23   ALT  16  16   BILITOT  0.3  0.7   ALKPHOS  66  66         Elizabeth Vasques MD  -------------------------------  Rounding hospitalist

## 2018-09-20 NOTE — PROGRESS NOTES
Progress note: Infectious diseases    Patient - Bronson Edouard,  Age - 81 y.o.    - 1945      Room Number - 4O-57/163-Y   MRN -  [id number]   Acct # - [de-identified]  Date of Admission -  9/15/2018  7:45 AM    SUBJECTIVE:   She is more awake. She had multiple strokes in the past  OBJECTIVE   VITALS    height is 5' 1\" (1.549 m) and weight is 118 lb 1.6 oz (53.6 kg). Her oral temperature is 97.9 °F (36.6 °C). Her blood pressure is 130/76 and her pulse is 72. Her respiration is 18 and oxygen saturation is 94%. Wt Readings from Last 3 Encounters:   18 118 lb 1.6 oz (53.6 kg)       I/O (24 Hours)    Intake/Output Summary (Last 24 hours) at 18 1231  Last data filed at 18 0416   Gross per 24 hour   Intake              360 ml   Output              800 ml   Net             -440 ml       General Appearance: she is more awake  HEENT - normocephalic, atraumatic, pink conjunctiva,  anicteric sclera  Neck - Supple, no mass.   Lungs -  Bilateral  air entry, + rhonchi,   Cardiovascular - Heart sounds are normal.    Abdomen - soft, not distended, nontender,   Neurologic -awake and oriented  Skin - No bruising or bleeding  Extremities - No edema, no cyanosis, clubbing     MEDICATIONS:      atorvastatin  20 mg Oral Nightly    sodium chloride flush  10 mL Intravenous 2 times per day    methylPREDNISolone  40 mg Intravenous Daily    megestrol  200 mg Oral BID    amLODIPine  10 mg Oral Daily    potassium replacement protocol   Other RX Placeholder    nicotine  1 patch Transdermal Daily    metoprolol tartrate  50 mg Oral BID    cefTRIAXone (ROCEPHIN) IV  1 g Intravenous Q24H       acetaminophen, sodium chloride flush, hydrALAZINE, loperamide, acetaminophen      LABS:     CBC:   Recent Labs      18   0542   WBC  10.0   HGB  10.9*   PLT  159     BMP:    Recent Labs      18   0542  18   0507   NA

## 2018-09-20 NOTE — PROGRESS NOTES
Consult received. Chart reviewed. Await medical work up and families decision on where she would like to go at discharge.

## 2018-09-20 NOTE — PROGRESS NOTES
10.9*   PLT  135  159     BMP:    Recent Labs      09/17/18   0609  09/18/18   0542   NA  137  139   K  3.5  3.6   CL  104  104   CO2  25  26   BUN  9  8   CREATININE  0.7  0.7   GLUCOSE  100  98     Calcium:  Recent Labs      09/18/18   0542   CALCIUM  8.4*     Ionized Calcium:No results for input(s): IONCA in the last 72 hours. Magnesium:  No results for input(s): MG in the last 72 hours. Phosphorus:No results for input(s): PHOS in the last 72 hours. BNP:No results for input(s): BNP in the last 72 hours. Glucose:No results for input(s): POCGLU in the last 72 hours. HgbA1C: No results for input(s): LABA1C in the last 72 hours. INR: No results for input(s): INR in the last 72 hours. Hepatic:   Recent Labs      09/18/18   0542   ALKPHOS  66   ALT  16   AST  25   PROT  6.2   BILITOT  0.3   LABALBU  3.4*     Amylase and Lipase:No results for input(s): LACTA, AMYLASE in the last 72 hours. Lactic Acid: No results for input(s): LACTA in the last 72 hours. Troponin: No results for input(s): CKTOTAL, CKMB, TROPONINI in the last 72 hours. BNP: No results for input(s): BNP in the last 72 hours. CULTURES:   UA: No results for input(s): SPECGRAV, PHUR, COLORU, CLARITYU, MUCUS, PROTEINU, BLOODU, RBCUA, WBCUA, BACTERIA, NITRU, GLUCOSEU, BILIRUBINUR, UROBILINOGEN, KETUA, LABCAST, LABCASTTY, AMORPHOS in the last 72 hours. Invalid input(s): CRYSTALS  Micro:   Lab Results   Component Value Date    BC No growth-preliminary 09/15/2018    BC No growth-preliminary 09/15/2018         IMAGING:         Problem list of patient:     Patient Active Problem List   Diagnosis Code    Acute febrile illness R50.9    History of arthritis Z87.39    Acute bronchitis J20.9    Fall at home W19. Iman Roles, Y92.009    Urinary frequency R35.0    Generalized weakness R53.1    Tobacco use disorder F17.200    Dizziness R42    Acute encephalopathy, unspecified G93.40    Diarrhea R19.7    History of CVA (cerebrovascular accident) Z80.78    Forgetfulness R68.89         ASSESSMENT/PLAN   Acute febrile illness.   Fall  Malnutrition  Abnormal MRI with stroke  Memory loss concern for vascular dementia  Due to persistent change in her mental state, will consult neurology and do LP  Esha Lau MD, FACP 9/19/2018 8:20 PM

## 2018-09-20 NOTE — PROGRESS NOTES
[facility]  INPATIENT PHYSICAL THERAPY  DAILY NOTE  STRZ RENAL TELEMETRY 6K - 6S-39/496-R    Time In: 6846  Time Out: 1328  Timed Code Treatment Minutes: 25 Minutes  Minutes: 25          Date: 2018  Patient Name: Marilia Chavez,  Gender:  female        MRN: [id number]  : 1945  (81 y.o.)     Referring Practitioner: Chelsea Chopra MD  Diagnosis: Febrile Illness  Additional Pertinent Hx: Per ED note, pt is a 81 y.o. female who presents to the ED for evaluation of a fever and generalized malaise. The patient has a two week history of a fever, generalized malaise, urinary frequency, nonproductive cough, and decreased appetite. The patient states that her symptoms have been persistent and unchanged since onset. She denies any abdominal pain, chest pain, nausea, vomiting, diarrhea, constipation, or other urinary symptoms. The patient states that her niece was sick with similar symptoms recently. The patient has not followed up with her PCP for the symptoms. The patient's  states that she fell getting off of the toilet this morning, patient and  do not believe that she hit her head. Past Medical History:   Diagnosis Date    Back ache     History of arthritis     Tobacco use disorder      Past Surgical History:   Procedure Laterality Date    JOINT REPLACEMENT Right        Restrictions/Precautions:  Fall Risk, General Precautions                            Prior Level of Function:  ADL Assistance: Independent  Homemaking Assistance: Independent ( does yardwork, ind with all other)  Ambulation Assistance: Independent  Transfer Assistance: Independent  Additional Comments: Pt reports very ind at PLOF, ambulates without device    Subjective:     Subjective: Rn approved session. pt in bathroom with family upon arrival and agrees to therapy. pts family encouraging    Pain:  Denies.           Social/Functional:  Lives With: Spouse  Type of Home: House  Home Layout: One level  Home

## 2018-09-21 ENCOUNTER — HOSPITAL ENCOUNTER (INPATIENT)
Age: 73
LOS: 7 days | Discharge: HOME HEALTH | DRG: 866 | End: 2018-09-28
Attending: FAMILY MEDICINE | Admitting: FAMILY MEDICINE
Payer: MEDICARE

## 2018-09-21 VITALS
BODY MASS INDEX: 21.9 KG/M2 | OXYGEN SATURATION: 93 % | TEMPERATURE: 97.2 F | HEIGHT: 61 IN | WEIGHT: 116 LBS | RESPIRATION RATE: 18 BRPM | HEART RATE: 61 BPM | DIASTOLIC BLOOD PRESSURE: 73 MMHG | SYSTOLIC BLOOD PRESSURE: 134 MMHG

## 2018-09-21 DIAGNOSIS — J20.9 ACUTE BRONCHITIS, UNSPECIFIED ORGANISM: Primary | ICD-10-CM

## 2018-09-21 DIAGNOSIS — A92.31 WEST NILE VIRUS INFECTION WITH ENCEPHALITIS: ICD-10-CM

## 2018-09-21 DIAGNOSIS — A92.30 WEST NILE VIRUS INFECTION: ICD-10-CM

## 2018-09-21 PROBLEM — R50.9 ACUTE FEBRILE ILLNESS: Status: RESOLVED | Noted: 2018-09-15 | Resolved: 2018-09-21

## 2018-09-21 PROBLEM — G93.41 METABOLIC ENCEPHALOPATHY: Status: RESOLVED | Noted: 2018-09-19 | Resolved: 2018-09-21

## 2018-09-21 PROBLEM — G93.41 METABOLIC ENCEPHALOPATHY: Status: ACTIVE | Noted: 2018-09-19

## 2018-09-21 PROBLEM — R35.0 URINARY FREQUENCY: Status: RESOLVED | Noted: 2018-09-19 | Resolved: 2018-09-21

## 2018-09-21 PROBLEM — R42 DIZZINESS: Status: RESOLVED | Noted: 2018-09-19 | Resolved: 2018-09-21

## 2018-09-21 PROBLEM — R19.7 DIARRHEA: Status: RESOLVED | Noted: 2018-09-19 | Resolved: 2018-09-21

## 2018-09-21 LAB
LYME ANTIBODY CSF: 0.25 LIV
VDRL CSF SCREEN: NONREACTIVE

## 2018-09-21 PROCEDURE — 97116 GAIT TRAINING THERAPY: CPT

## 2018-09-21 PROCEDURE — 2709999900 HC NON-CHARGEABLE SUPPLY

## 2018-09-21 PROCEDURE — 97110 THERAPEUTIC EXERCISES: CPT

## 2018-09-21 PROCEDURE — 6370000000 HC RX 637 (ALT 250 FOR IP): Performed by: INTERNAL MEDICINE

## 2018-09-21 PROCEDURE — 0220000000 HC SKILLED NURSING FACILITY

## 2018-09-21 PROCEDURE — 2580000003 HC RX 258: Performed by: INTERNAL MEDICINE

## 2018-09-21 PROCEDURE — 92523 SPEECH SOUND LANG COMPREHEN: CPT

## 2018-09-21 PROCEDURE — 1290000000 HC SEMI PRIVATE OTHER R&B

## 2018-09-21 PROCEDURE — 99239 HOSP IP/OBS DSCHRG MGMT >30: CPT | Performed by: INTERNAL MEDICINE

## 2018-09-21 RX ORDER — NICOTINE 21 MG/24HR
1 PATCH, TRANSDERMAL 24 HOURS TRANSDERMAL DAILY
Status: DISCONTINUED | OUTPATIENT
Start: 2018-09-22 | End: 2018-09-23

## 2018-09-21 RX ORDER — CLONIDINE HYDROCHLORIDE 0.1 MG/1
0.1 TABLET ORAL EVERY 6 HOURS PRN
Status: CANCELLED | OUTPATIENT
Start: 2018-09-21

## 2018-09-21 RX ORDER — PREDNISONE 20 MG/1
40 TABLET ORAL DAILY
Status: DISCONTINUED | OUTPATIENT
Start: 2018-09-22 | End: 2018-09-23

## 2018-09-21 RX ORDER — PREDNISONE 20 MG/1
40 TABLET ORAL DAILY
Status: CANCELLED | OUTPATIENT
Start: 2018-09-22 | End: 2018-09-24

## 2018-09-21 RX ORDER — ASPIRIN 81 MG/1
81 TABLET ORAL DAILY
Status: CANCELLED | OUTPATIENT
Start: 2018-09-22

## 2018-09-21 RX ORDER — NICOTINE 21 MG/24HR
1 PATCH, TRANSDERMAL 24 HOURS TRANSDERMAL DAILY
Status: CANCELLED | OUTPATIENT
Start: 2018-09-22

## 2018-09-21 RX ORDER — ACETAMINOPHEN 325 MG/1
650 TABLET ORAL EVERY 6 HOURS PRN
Status: DISCONTINUED | OUTPATIENT
Start: 2018-09-21 | End: 2018-09-24 | Stop reason: SDUPTHER

## 2018-09-21 RX ORDER — AMLODIPINE BESYLATE 10 MG/1
10 TABLET ORAL DAILY
Status: CANCELLED | OUTPATIENT
Start: 2018-09-22

## 2018-09-21 RX ORDER — ALBUTEROL SULFATE 90 UG/1
2 AEROSOL, METERED RESPIRATORY (INHALATION) EVERY 4 HOURS PRN
Status: CANCELLED | OUTPATIENT
Start: 2018-09-21

## 2018-09-21 RX ORDER — ASPIRIN 81 MG/1
81 TABLET ORAL DAILY
Status: DISCONTINUED | OUTPATIENT
Start: 2018-09-22 | End: 2018-09-28 | Stop reason: HOSPADM

## 2018-09-21 RX ORDER — ALBUTEROL SULFATE 90 UG/1
2 AEROSOL, METERED RESPIRATORY (INHALATION) EVERY 4 HOURS PRN
Status: DISCONTINUED | OUTPATIENT
Start: 2018-09-21 | End: 2018-09-21 | Stop reason: HOSPADM

## 2018-09-21 RX ORDER — ALBUTEROL SULFATE 90 UG/1
2 AEROSOL, METERED RESPIRATORY (INHALATION) EVERY 4 HOURS PRN
Status: DISCONTINUED | OUTPATIENT
Start: 2018-09-21 | End: 2018-09-28 | Stop reason: HOSPADM

## 2018-09-21 RX ORDER — IPRATROPIUM BROMIDE AND ALBUTEROL SULFATE 2.5; .5 MG/3ML; MG/3ML
1 SOLUTION RESPIRATORY (INHALATION) EVERY 4 HOURS PRN
Status: CANCELLED | OUTPATIENT
Start: 2018-09-21

## 2018-09-21 RX ORDER — METOPROLOL TARTRATE 50 MG/1
50 TABLET, FILM COATED ORAL 2 TIMES DAILY
Status: CANCELLED | OUTPATIENT
Start: 2018-09-21

## 2018-09-21 RX ORDER — IPRATROPIUM BROMIDE AND ALBUTEROL SULFATE 2.5; .5 MG/3ML; MG/3ML
1 SOLUTION RESPIRATORY (INHALATION) EVERY 4 HOURS PRN
Status: DISCONTINUED | OUTPATIENT
Start: 2018-09-21 | End: 2018-09-21 | Stop reason: HOSPADM

## 2018-09-21 RX ORDER — ACETAMINOPHEN 325 MG/1
650 TABLET ORAL EVERY 6 HOURS PRN
Status: CANCELLED | OUTPATIENT
Start: 2018-09-21

## 2018-09-21 RX ORDER — LOPERAMIDE HYDROCHLORIDE 2 MG/1
2 CAPSULE ORAL 4 TIMES DAILY PRN
Status: DISCONTINUED | OUTPATIENT
Start: 2018-09-21 | End: 2018-09-28 | Stop reason: HOSPADM

## 2018-09-21 RX ORDER — ATORVASTATIN CALCIUM 20 MG/1
20 TABLET, FILM COATED ORAL NIGHTLY
Status: DISCONTINUED | OUTPATIENT
Start: 2018-09-21 | End: 2018-09-28 | Stop reason: HOSPADM

## 2018-09-21 RX ORDER — LOPERAMIDE HYDROCHLORIDE 2 MG/1
2 CAPSULE ORAL 4 TIMES DAILY PRN
Status: CANCELLED | OUTPATIENT
Start: 2018-09-21

## 2018-09-21 RX ORDER — METOPROLOL TARTRATE 50 MG/1
50 TABLET, FILM COATED ORAL 2 TIMES DAILY
Status: DISCONTINUED | OUTPATIENT
Start: 2018-09-21 | End: 2018-09-28 | Stop reason: HOSPADM

## 2018-09-21 RX ORDER — ATORVASTATIN CALCIUM 20 MG/1
20 TABLET, FILM COATED ORAL NIGHTLY
Status: CANCELLED | OUTPATIENT
Start: 2018-09-21

## 2018-09-21 RX ORDER — AMLODIPINE BESYLATE 10 MG/1
10 TABLET ORAL DAILY
Status: DISCONTINUED | OUTPATIENT
Start: 2018-09-22 | End: 2018-09-23

## 2018-09-21 RX ORDER — CLONIDINE HYDROCHLORIDE 0.1 MG/1
0.1 TABLET ORAL EVERY 6 HOURS PRN
Status: DISCONTINUED | OUTPATIENT
Start: 2018-09-21 | End: 2018-09-21 | Stop reason: HOSPADM

## 2018-09-21 RX ORDER — IPRATROPIUM BROMIDE AND ALBUTEROL SULFATE 2.5; .5 MG/3ML; MG/3ML
1 SOLUTION RESPIRATORY (INHALATION) EVERY 4 HOURS PRN
Status: DISCONTINUED | OUTPATIENT
Start: 2018-09-21 | End: 2018-09-23

## 2018-09-21 RX ORDER — CLONIDINE HYDROCHLORIDE 0.1 MG/1
0.1 TABLET ORAL EVERY 6 HOURS PRN
Status: DISCONTINUED | OUTPATIENT
Start: 2018-09-21 | End: 2018-09-23

## 2018-09-21 RX ADMIN — Medication 10 ML: at 08:22

## 2018-09-21 RX ADMIN — METOPROLOL TARTRATE 50 MG: 50 TABLET, FILM COATED ORAL at 23:03

## 2018-09-21 RX ADMIN — METOPROLOL TARTRATE 50 MG: 50 TABLET, FILM COATED ORAL at 08:20

## 2018-09-21 RX ADMIN — AMLODIPINE BESYLATE 10 MG: 10 TABLET ORAL at 08:21

## 2018-09-21 RX ADMIN — MEGESTROL ACETATE 200 MG: 40 SUSPENSION ORAL at 08:24

## 2018-09-21 RX ADMIN — ASPIRIN 81 MG: 81 TABLET, COATED ORAL at 08:24

## 2018-09-21 RX ADMIN — PREDNISONE 40 MG: 20 TABLET ORAL at 08:21

## 2018-09-21 RX ADMIN — ATORVASTATIN CALCIUM 20 MG: 20 TABLET, FILM COATED ORAL at 23:03

## 2018-09-21 ASSESSMENT — PAIN SCALES - GENERAL
PAINLEVEL_OUTOF10: 0

## 2018-09-21 NOTE — PROGRESS NOTES
*        Plan:  1. Acute bronchitis. Continue breathing rx, antibiotics. Mucinex in place. 2. High-grade fever has since resolved. Continue antibiotics for now. 3. Memory lapsis. No focal neuro deficits. Status post LP, which was unremarkable. TSH and B12 normal.  4. Does have prior hx of CVA per abnormal findings on MRI-brain. She has been started on statin and aspirin. Counseled on tobacco cessation. 5. Continue ongoing therapy. Maintain on fall precautions. Awaiting precert for placement in TCU or inpatient rehab. Diet: DIET GENERAL;    Code status: Full Code     ----------  Subjective  Feels tired. No shortness of breath. Objective  Physical exam:  Vitals: /73   Pulse 61   Temp 97.2 °F (36.2 °C) (Oral)   Resp 18   Ht 5' 1\" (1.549 m)   Wt 116 lb (52.6 kg)   SpO2 93%   BMI 21.92 kg/m²   Gen/overall appearance: Not in acute distress. Alert. Oriented X3  Head: Normocephalic, atraumatic  Eyes: EOMI, good acuity  ENT: Oral mucosa moist  Neck: Negative jolt. No JVD, thyromegaly  CVS: Nml S1S2, no MRG, RRR  Pulm: Ronchi improved. Distant wheezes. Gastrointestinal: Soft, NT/ND, +BS  Musculoskeletal: No edema. Warm  Neuro: Generalized weakness. No focal deficits. Psychiatry: Appropriate affect. Not agitated. Skin: Warm, dry with normal turgor. No rash  Capillary refill: Brisk,< 3 seconds   Peripheral Pulses: +2 palpable, equal bilaterally       24HR INTAKE/OUTPUT:      Intake/Output Summary (Last 24 hours) at 09/21/18 1308  Last data filed at 09/21/18 0514   Gross per 24 hour   Intake              110 ml   Output              600 ml   Net             -490 ml     I/O last 3 completed shifts: In: 110 [P.O.:110]  Out: 600 [Urine:600]  No intake/output data recorded.       Meds:    atorvastatin  20 mg Oral Nightly    predniSONE  40 mg Oral Daily    aspirin  81 mg Oral Daily    sodium chloride flush  10 mL Intravenous 2 times per day    megestrol  200 mg Oral BID    amLODIPine  10 mg Oral

## 2018-09-21 NOTE — DISCHARGE SUMMARY
Hospital Discharge Summary    Patient's PCP: Fabiano Guthrie MD  Admit Date: 9/15/2018   Discharge Date: 9/21/2018    Admitting Physician: Dr. Suhail Esquivel admitting provider for patient encounter. Discharge Physician: Dr. Mario Baker:   IP CONSULT TO INFECTIOUS DISEASES  IP CONSULT TO NEUROLOGY  IP CONSULT TO REHAB/TCU ADMISSION COORDINATOR  IP CONSULT TO SOCIAL WORK  IP CONSULT TO DIETITIAN  IP CONSULT TO RECREATION THERAPY  IP CONSULT TO SOCIAL WORK    Brief HPI: Patient admitted with acute febrile illness    Brief hospital course: 72F with hx of tobacco use disorder, arthritis, who presents with fever, cough, generalized weakness, urinary frequency and fall without loss of consciousness following an episode of dizziness. Temperature noted to be 102.2F on presentation, peaked at 102.6. She had multiple unremarkable tests, including negative viral panel, negative rapid influenza screen, negative blood culture result, unremarkable CXR. TSH is normal. Sed rate normal when adjusted for age. Urinalysis is only significant for proteinuria and ketonuria; no evidence of infection. CT-head and MRi-brain consistent with findings of old lacuna infarct in bilateral basal ganglia. CT-cervical spine was essentially unremarkable. Carotid US unremarkable for flow-limiting disease and MRI-brain with no evidence of acute intracranial pathology; however, there is evidence of chronic microvascular disease and small areas of encephalomalacia noted within bilateral basal ganglia concerning for remote infarcts. MRI-cervical spine obtained which did not demonstrate abnormal signal or enhancement identified at the cervical medullary junction or within the spinal cord, has noted multilevel spondylitic changes throughout the cervical spine, disc osteophyte resulting in mild to moderate spinal canal narrowing. 1. Acute bronchitis. Much improved. Continue breathing rx PRN. Completed course of abx inpatient.   2. High-grade unremarkable. The calvarium is intact without acute fracture or aggressive, bony destructive process. No acute intracranial abnormality is identified. Small foci of low-attenuation are noted within the bilateral basal ganglia which likely correspond to age indeterminant lacunar infarcts. Sequela of chronic microvascular ischemic change are also noted within the periventricular and deep cerebral white matter. If there is clinical concern for acute ischemia, MRI is more sensitive and may be considered. **This report has been created using voice recognition software. It may contain minor errors which are inherent in voice recognition technology. ** Final report electronically signed by Dr. Warren Middleton on 9/15/2018 9:45 AM    Ct Cervical Spine Wo Contrast    Result Date: 9/15/2018  PROCEDURE: CT CERVICAL SPINE WO CONTRAST CLINICAL INFORMATION: Trauma. COMPARISON: None available. TECHNIQUE: 3 mm noncontrast axial images were obtained through the cervical spine with sagittal and coronal reconstructions. All CT scans at this facility use dose modulation, iterative reconstruction, and/or weight-based dosing when appropriate to reduce radiation dose to as low as reasonably achievable. FINDINGS: The images are degraded by motion artifact which limits detailed evaluation. There is slight reversal of the expected cervical lordosis which is likely secondary to patient positioning or muscle spasm. Minimal anterolisthesis of C2 on C3 and C4 on C5 is likely secondary to degenerative facet arthropathy. No acute fracture or traumatic malalignment is identified. There is degenerative disc space narrowing and endplate spurring at multiple levels. This is most notable at C3-4 resulting in mild spinal canal narrowing. No prevertebral soft tissue swelling is present. There are no suspicious findings in the cervical soft tissues. Emphysema is present within the bilateral lung apices.       No acute fracture or traumatic infarcts. No restricted diffusion is identified to suggest acute ischemia. Susceptibility in the bilateral basal ganglia likely corresponds to age-related mineralization. The ventricles are midline without evidence of hydrocephalus. No mass, mass effect or extra-axial fluid collection is identified. The basal cisterns and visualized vascular flow voids are patent. The pituitary, brain stem are within normal limits. Note is made of a small focus of hypointense T1 signal within the right side of the cervical medullary junction seen only on the sagittal T1-weighted images (image 13 of series 11). The visualized orbits demonstrate absence of the native lenses bilaterally. There is partial fluid opacification of the mastoid air cells on the right which may correspond to a mastoid effusion or mastoiditis. The paranasal sinuses are within normal limits. 1. No acute intracranial abnormality is identified. Moderate patchy foci of hyperintense T2 signal are noted throughout the periventricular and deep cerebral white matter which likely correspond to sequela of chronic microvascular ischemic angiopathy. Small areas of encephalomalacia are noted also noted within the bilateral basal ganglia which likely corresponds to remote lacunar infarcts. No evidence to suggest acute ischemia. 2. There is a small focus of hypointense T1 signal at the right side of the cervical medullary junction. This is not further evaluated on the additional sequences and may be artifactual versus may represent an area of myelomalacia. An underlying lesion is not completely excluded. Further evaluation with a dedicated MRI of the cervical spine with and without contrast may be considered, if clinically warranted. **This report has been created using voice recognition software. It may contain minor errors which are inherent in voice recognition technology. ** Final report electronically signed by Dr. Warren Middleton on 9/18/2018 8:40 AM    Fl pressure 7.2 cm water. Clear CSF     Uncomplicated lumbar puncture as detailed above. **This report has been created using voice recognition software. It may contain minor errors which are inherent in voice recognition technology. ** Final report electronically signed by Dr. Kena Jason on 9/20/2018 11:28 AM            Treatments: As above. Discharge Medications:  See discharge and readmit orders per [facility]. Activity: activity as tolerated  Diet: DIET GENERAL;      Disposition: TCU  Discharged Condition: Stable  Follow Up:   Julane Holter, MD  [address] [place] [address] [address] [place] [address]  [phone]    Schedule an appointment as soon as possible for a visit in 1 week      [facility] and [address] [place] [address] [address] [facility]            Code status:  Full Code       Total time spent on discharge, finalizing medications, referrals and arranging outpatient follow up was more than 30 minutes      Thank you Dr. Julane Holter, MD for the opportunity to be involved in this patients care.

## 2018-09-21 NOTE — PROGRESS NOTES
Progress note: Infectious diseases    Patient - Dawna Gardner,  Age - 81 y.o.    - 1945      Room Number - 9Z-75/778-G   MRN -  [id number]   Acct # - [de-identified]  Date of Admission -  9/15/2018  7:45 AM    SUBJECTIVE:   No new issues  Afebrile CSF study negative  OBJECTIVE   VITALS    height is 5' 1\" (1.549 m) and weight is 116 lb (52.6 kg). Her oral temperature is 97.2 °F (36.2 °C). Her blood pressure is 134/73 and her pulse is 61. Her respiration is 18 and oxygen saturation is 93%. Wt Readings from Last 3 Encounters:   18 116 lb (52.6 kg)       I/O (24 Hours)    Intake/Output Summary (Last 24 hours) at 18 1325  Last data filed at 18 0545   Gross per 24 hour   Intake              110 ml   Output              600 ml   Net             -490 ml       General Appearance: asleep  HEENT - normocephalic, atraumatic, pink conjunctiva,  anicteric sclera  Neck - Supple, no mass. Lungs -  Bilateral  air entry, + rhonchi,   Cardiovascular - Heart sounds are normal.    Abdomen - soft, not distended, nontender,   Neurologic -awake and oriented  Skin - No bruising or bleeding  Extremities - No edema, no cyanosis, clubbing     MEDICATIONS:      atorvastatin  20 mg Oral Nightly    predniSONE  40 mg Oral Daily    aspirin  81 mg Oral Daily    sodium chloride flush  10 mL Intravenous 2 times per day    megestrol  200 mg Oral BID    amLODIPine  10 mg Oral Daily    potassium replacement protocol   Other RX Placeholder    nicotine  1 patch Transdermal Daily    metoprolol tartrate  50 mg Oral BID    cefTRIAXone (ROCEPHIN) IV  1 g Intravenous Q24H       acetaminophen, sodium chloride flush, hydrALAZINE, loperamide, acetaminophen      LABS:     CBC:   No results for input(s): WBC, HGB, PLT in the last 72 hours.   BMP:    Recent Labs      18   0507   NA  138   K  3.5   CL  100   CO2  26   BUN  9

## 2018-09-21 NOTE — PROGRESS NOTES
[address] [address]  SPEECH THERAPY  STRZ RENAL TELEMETRY 6K  Speech  Language  Cognitive Evaluation    SLP Individual Minutes  Time In: 0740  Time Out: 8622  Minutes: 15          Date: 2018  Patient Name: Austin Cade      MRN: [id number]    : 1945  (81 y.o.)  Gender: female   Referring Physician:  Dr Meerdith Brian  Diagnosis: febrile illness  Secondary Diagnosis:  cognitive impairment  History of Present Illness/Injury: Per ED note, pt is a 81 y.o. female who presents to the ED for evaluation of a fever and generalized malaise. The patient has a two week history of a fever, generalized malaise, urinary frequency, nonproductive cough, and decreased appetite. The patient states that her symptoms have been persistent and unchanged since onset. She denies any abdominal pain, chest pain, nausea, vomiting, diarrhea, constipation, or other urinary symptoms. The patient states that her niece was sick with similar symptoms recently. The patient has not followed up with her PCP for the symptoms. The patient's  states that she fell getting off of the toilet this morning, patient and  do not believe that she hit her head. Speech to evaluate cognition given confusion reported during this hospital stay. Past Medical History:   Diagnosis Date    Back ache     History of arthritis     Tobacco use disorder        Precautions: falls  Pain:  0/10    Subjective:  Pt seen up in chair. Alert and cooperative. SOCIAL HISTORY: Pt lives in [facility] with . Pt reports she works part time at [employer] [place]. Independent with IADLS including medication, finances, and driving. COGNITION:  [place] Cognitive Assessment [facility]) version 7.3  completed. Pt scored 20/30. Normal is greater than or equal to 26/30.   Orientation: 6/6  Short term recall: 2/5  Divergent naming: impaired 7/minute  Problem solving: impaired  Reasoning: verbal 1/2 visual 4/5  Thought organization: impaired  Insight: functional problem solving and reasoning tasks with 70% accuracy to improve completion of high level home management tasks  Goal 4: Pt will complete thought organizatin and divergent naming tasks with 70% accuracy to improve mental flexibilty    LONG TERM GOALS:  No LTG due to short ELOS    Alessandra Reaves M.S. [id number]

## 2018-09-21 NOTE — CARE COORDINATION
9/20/18 10:45 AM     Neurology consulted. Patient having LP today. Remains on IV solumedrol and IV rocephin. ID following. Working with PT/OT, amb 15 feet. Cande MILLAN notes possible transfer to [facility] [employer]. Will monitor for orders. 11:04 AM  Checked [employer], they do not accept [employer]. Cande MILLAN notified. She questions if [facility] is in network, as it would be closer for family. Per  website, they are in network.
9/20/18 2:23 PM     Met with Salina Sharif and family to discuss discharge options, and Dr. Marylene Pacific note indicating that rehab is needed. Explained options of TCU/Rehab vs ECF. Salina Sharif states she would like to return home with [place] for therapies. Family members state that Salina Sharif will have someone with her 24/7, and feel that [facility] would be the best option at this time. Will involve SW to present list of agencies in a.m.
9/21/18 9:10 AM     Dr. Harsh Kearns states patient now would like TCU. Spoke with Lenin Soto, she will initiate precert. Bed available today/this weekend if precert returned.
9/21/18, 8:35 AM    DISCHARGE BARRIERS    SW order received for HH vs ECF. TCU consult was placed, pt is agreeable and pre-cert has been started today.
Screening Completed: yes  PCP: Neal Marc MD  Readmission: no  Readmission Risk Score: 9%    Discharge Planning  Current Residence:  Independent Living  Living Arrangements:  Spouse/Significant Other   Support Systems:  Spouse/Significant Other, Family Members  Current Services PTA:     Potential Assistance Needed:  N/A  Potential Assistance Purchasing Medications:  No  Does patient want to participate in local refill/ meds to beds program?  No  Type of Home Care Services:  None  Patient expects to be discharged to:  home  Expected Discharge date:  09/18/18  Follow Up Appointment: Best Day/ Time: Monday AM    Discharge Plan: Met with Myrtha Bernheim. She currently lives at home with her significant other. Plan is to return at discharge. She denies any need for DME or HH. Will follow.      Evaluation: no

## 2018-09-22 PROBLEM — M16.12 PRIMARY OSTEOARTHRITIS OF LEFT HIP: Status: ACTIVE | Noted: 2018-09-22

## 2018-09-22 PROBLEM — E78.1 PURE HYPERGLYCERIDEMIA: Status: ACTIVE | Noted: 2018-09-22

## 2018-09-22 PROBLEM — M47.812 DJD (DEGENERATIVE JOINT DISEASE), CERVICAL: Status: ACTIVE | Noted: 2018-09-22

## 2018-09-22 PROBLEM — I67.9 CEREBROVASCULAR DISEASE: Status: ACTIVE | Noted: 2018-09-22

## 2018-09-22 PROBLEM — M47.816 SPONDYLOSIS OF LUMBAR REGION WITHOUT MYELOPATHY OR RADICULOPATHY: Status: ACTIVE | Noted: 2018-09-22

## 2018-09-22 PROBLEM — I10 ESSENTIAL HYPERTENSION: Status: ACTIVE | Noted: 2018-09-22

## 2018-09-22 PROBLEM — M41.20 IDIOPATHIC SCOLIOSIS: Status: ACTIVE | Noted: 2018-09-22

## 2018-09-22 PROCEDURE — 6370000000 HC RX 637 (ALT 250 FOR IP): Performed by: INTERNAL MEDICINE

## 2018-09-22 PROCEDURE — 97116 GAIT TRAINING THERAPY: CPT

## 2018-09-22 PROCEDURE — 92523 SPEECH SOUND LANG COMPREHEN: CPT

## 2018-09-22 PROCEDURE — 6360000002 HC RX W HCPCS: Performed by: FAMILY MEDICINE

## 2018-09-22 PROCEDURE — 97110 THERAPEUTIC EXERCISES: CPT

## 2018-09-22 PROCEDURE — 1290000000 HC SEMI PRIVATE OTHER R&B

## 2018-09-22 PROCEDURE — 6370000000 HC RX 637 (ALT 250 FOR IP): Performed by: FAMILY MEDICINE

## 2018-09-22 PROCEDURE — 2709999900 HC NON-CHARGEABLE SUPPLY

## 2018-09-22 PROCEDURE — 97161 PT EVAL LOW COMPLEX 20 MIN: CPT

## 2018-09-22 RX ORDER — FAMOTIDINE 20 MG/1
20 TABLET, FILM COATED ORAL 2 TIMES DAILY
Status: DISCONTINUED | OUTPATIENT
Start: 2018-09-22 | End: 2018-09-28 | Stop reason: HOSPADM

## 2018-09-22 RX ORDER — DOCUSATE SODIUM 100 MG/1
100 CAPSULE, LIQUID FILLED ORAL DAILY PRN
Status: DISCONTINUED | OUTPATIENT
Start: 2018-09-22 | End: 2018-09-28 | Stop reason: HOSPADM

## 2018-09-22 RX ORDER — POLYETHYLENE GLYCOL 3350 17 G/17G
17 POWDER, FOR SOLUTION ORAL DAILY PRN
Status: DISCONTINUED | OUTPATIENT
Start: 2018-09-22 | End: 2018-09-28 | Stop reason: HOSPADM

## 2018-09-22 RX ORDER — SENNA PLUS 8.6 MG/1
1 TABLET ORAL NIGHTLY PRN
Status: DISCONTINUED | OUTPATIENT
Start: 2018-09-22 | End: 2018-09-28 | Stop reason: HOSPADM

## 2018-09-22 RX ORDER — TRAMADOL HYDROCHLORIDE 50 MG/1
50 TABLET ORAL EVERY 6 HOURS PRN
Status: DISCONTINUED | OUTPATIENT
Start: 2018-09-22 | End: 2018-09-28 | Stop reason: HOSPADM

## 2018-09-22 RX ADMIN — TRAMADOL HYDROCHLORIDE 50 MG: 50 TABLET, FILM COATED ORAL at 14:42

## 2018-09-22 RX ADMIN — ATORVASTATIN CALCIUM 20 MG: 20 TABLET, FILM COATED ORAL at 22:27

## 2018-09-22 RX ADMIN — TRAMADOL HYDROCHLORIDE 50 MG: 50 TABLET, FILM COATED ORAL at 22:27

## 2018-09-22 RX ADMIN — METOPROLOL TARTRATE 50 MG: 50 TABLET, FILM COATED ORAL at 08:28

## 2018-09-22 RX ADMIN — PREDNISONE 40 MG: 20 TABLET ORAL at 08:28

## 2018-09-22 RX ADMIN — ACETAMINOPHEN 650 MG: 325 TABLET ORAL at 08:28

## 2018-09-22 RX ADMIN — AMLODIPINE BESYLATE 10 MG: 10 TABLET ORAL at 08:28

## 2018-09-22 RX ADMIN — ENOXAPARIN SODIUM 40 MG: 40 INJECTION SUBCUTANEOUS at 22:27

## 2018-09-22 RX ADMIN — ASPIRIN 81 MG: 81 TABLET, COATED ORAL at 08:28

## 2018-09-22 RX ADMIN — FAMOTIDINE 20 MG: 20 TABLET ORAL at 08:28

## 2018-09-22 RX ADMIN — ACETAMINOPHEN 650 MG: 325 TABLET ORAL at 14:34

## 2018-09-22 RX ADMIN — METOPROLOL TARTRATE 50 MG: 50 TABLET, FILM COATED ORAL at 22:27

## 2018-09-22 RX ADMIN — FAMOTIDINE 20 MG: 20 TABLET ORAL at 22:27

## 2018-09-22 ASSESSMENT — PAIN SCALES - GENERAL
PAINLEVEL_OUTOF10: 4
PAINLEVEL_OUTOF10: 0
PAINLEVEL_OUTOF10: 8

## 2018-09-22 ASSESSMENT — PAIN DESCRIPTION - ORIENTATION: ORIENTATION: LOWER

## 2018-09-22 ASSESSMENT — PAIN DESCRIPTION - LOCATION: LOCATION: BACK

## 2018-09-22 ASSESSMENT — PAIN DESCRIPTION - PAIN TYPE: TYPE: CHRONIC PAIN

## 2018-09-22 ASSESSMENT — PAIN DESCRIPTION - DESCRIPTORS: DESCRIPTORS: ACHING

## 2018-09-22 NOTE — H&P
TCU History and Physical      Chief Complaint and Reason for TCU Admission:   Need for the continuation of therapy time following the acute hospital stay. History of Present Illness:  Femi Garcia  is a 81 y.o. female admitted to the transitional care unit on 9/21/2018. She was admitted from the ED with fever and confusion. It was thought she had bronchitis which has now resolved. She had numerous tests done to evaluate the confusion without an exact etiology being found. She needs continuation of therapies including SLP before the return home. Past Medical History:      Diagnosis Date    Back ache     History of arthritis     History of CVA (cerebrovascular accident)     Tobacco use disorder        Primary care provider: Evert Calles MD     Past Surgical History:      Procedure Laterality Date    JOINT REPLACEMENT Right        Allergies:    Patient has no known allergies.     Current Medications:    Current Facility-Administered Medications: famotidine (PEPCID) tablet 20 mg, 20 mg, Oral, BID  magnesium hydroxide (MILK OF MAGNESIA) 400 MG/5ML suspension 30 mL, 30 mL, Oral, Daily PRN  docusate sodium (COLACE) capsule 100 mg, 100 mg, Oral, Daily PRN  senna (SENOKOT) tablet 8.6 mg, 1 tablet, Oral, Nightly PRN  polyethylene glycol (GLYCOLAX) packet 17 g, 17 g, Oral, Daily PRN  enoxaparin (LOVENOX) injection 40 mg, 40 mg, Subcutaneous, Daily  albuterol sulfate  (90 Base) MCG/ACT inhaler 2 puff, 2 puff, Inhalation, Q4H PRN  ipratropium-albuterol (DUONEB) nebulizer solution 1 ampule, 1 ampule, Inhalation, Q4H PRN  [START ON 9/24/2018] ppd (tuberculin skin test) read, , Does not apply, Weekly  tuberculin injection 5 Units, 5 Units, Intradermal, Weekly  acetaminophen (TYLENOL) tablet 650 mg, 650 mg, Oral, Q6H PRN  amLODIPine (NORVASC) tablet 10 mg, 10 mg, Oral, Daily  aspirin EC tablet 81 mg, 81 mg, Oral, Daily  atorvastatin (LIPITOR) tablet 20 mg, 20 mg, Oral, Nightly  cloNIDine (CATAPRES) tablet 0.1 mg, 0.1 mg, Oral, Q6H PRN  loperamide (IMODIUM) capsule 2 mg, 2 mg, Oral, 4x Daily PRN  metoprolol tartrate (LOPRESSOR) tablet 50 mg, 50 mg, Oral, BID  nicotine (NICODERM CQ) 21 MG/24HR 1 patch, 1 patch, Transdermal, Daily  predniSONE (DELTASONE) tablet 40 mg, 40 mg, Oral, Daily     Resident is taking an antipsychotic medication? No     If yes, was Gradual Dose Reduction (GDR) attempted? NA     No- GDR is clinically contraindicated due to the fact that tapering the medication  would not achieve the desired therapeutic effects and the current dose is  necessary to maintain or improve the resident's function, well-being, safety, and  quality of life. Social History:  Social History     Social History    Marital status: Single     Spouse name: N/A    Number of children: N/A    Years of education: N/A     Occupational History    Not on file.      Social History Main Topics    Smoking status: Current Every Day Smoker     Packs/day: 0.50     Types: Cigarettes    Smokeless tobacco: Never Used    Alcohol use No    Drug use: No    Sexual activity: Not on file     Other Topics Concern    Not on file     Social History Narrative    No narrative on file           Family History:   Family History   Problem Relation Age of Onset    Cancer Mother        Review of Systems:  CONSTITUTIONAL:  positive for  fatigue  EYES:  positive for  glasses  HEENT:  negative for  nasal congestion  RESPIRATORY:  negative for  dyspnea  CARDIOVASCULAR:  negative for  chest pain  GASTROINTESTINAL:  negative for abdominal pain  GENITOURINARY:  negative for dysuria  SKIN:  negative  HEMATOLOGIC/LYMPHATIC:  negative for easy bruising  MUSCULOSKELETAL:  positive for  myalgias and arthralgias  NEUROLOGICAL:  positive for memory problems and weakness  BEHAVIOR/PSYCH:  positive for poor concentration  10 point system review otherwise negative    Physical Exam:  /86   Pulse 66   Temp 98.1 °F (36.7 °C)   Resp 18   Ht 5' 1\" (1.549 m)   Wt 119 lb (54 kg)   SpO2 98%   BMI 22.48 kg/m²   Well developed well nourished white female who is awake alert and cooperative sitting in her chair  Skin atrophic  Head normocephalic  Membranes moist  Neck without mass  Chest symmetrical  Lungs CTA  Heart S1S2 without murmur  Abd soft, non tender, normoactive BS and no mass  Ext without edema  Neuro somewhat slow with speech and following commands  Psy cooperative    Diagnostics:  No results found for this or any previous visit (from the past 24 hour(s)). Impression:  Active Hospital Problems    Diagnosis Date Noted    Primary osteoarthritis of left hip [M16.12] 09/22/2018    Spondylosis of lumbar region without myelopathy or radiculopathy [M47.816] 09/22/2018    DJD (degenerative joint disease), cervical [M50.30] 09/22/2018    Idiopathic scoliosis [M41.20] 09/22/2018    Cerebrovascular disease [I67.9] 09/22/2018    Essential hypertension [I10] 09/22/2018    Pure hyperglyceridemia [E78.1] 09/22/2018    Generalized weakness [R53.1] 09/19/2018    Acute bronchitis [J20.9] 09/19/2018    History of CVA (cerebrovascular accident) [Z86.73] 09/19/2018    Tobacco use disorder [F17.200]    ·      Plan:   · The patient demonstrates good potential to participate in a skilled rehabilitation program on the transitional care unit. Rehabilitation services will include PT, OT and SLP/RT in order to improve functional status prior to discharge. Family education and training will be completed. Equipment evaluations and recommendations will be completed as appropriate. · Nursing will be involved for bowel, bladder, skin, and pain management. Nursing will also provide education and training to patient and family. · Prophylaxis:  DVT: lovenox. GI: pepcid. · Pain: tylenol  · Nutrition:  Consultation to dietician for nutritional counseling and recommendations. Prealbumin will be checked on admission.     · Bladder: continent  · Bowel: mom, colace, senokot, glycolax  ·  and case management consultations for coordination of care and discharge planning    Darshan Fuentes MD

## 2018-09-23 PROBLEM — A92.30 WEST NILE VIRUS INFECTION: Status: ACTIVE | Noted: 2018-09-23

## 2018-09-23 PROBLEM — A92.31 WEST NILE VIRUS INFECTION WITH ENCEPHALITIS: Status: ACTIVE | Noted: 2018-09-23

## 2018-09-23 LAB
HERPES SIMPLEX VIRUS BY PCR: NOT DETECTED
HSV SOURCE: NORMAL
MYELIN BASIC PROTEIN, CSF: 12.78 NG/ML (ref 0–5.5)
OLIGOCLONAL BANDS CSF: NORMAL
WEST NILE IGG, CSF: NORMAL
WEST NILE IGM ANTIBODY CSF: NORMAL

## 2018-09-23 PROCEDURE — 6370000000 HC RX 637 (ALT 250 FOR IP): Performed by: INTERNAL MEDICINE

## 2018-09-23 PROCEDURE — 99232 SBSQ HOSP IP/OBS MODERATE 35: CPT | Performed by: INTERNAL MEDICINE

## 2018-09-23 PROCEDURE — 1290000000 HC SEMI PRIVATE OTHER R&B

## 2018-09-23 PROCEDURE — 6370000000 HC RX 637 (ALT 250 FOR IP): Performed by: FAMILY MEDICINE

## 2018-09-23 PROCEDURE — 97535 SELF CARE MNGMENT TRAINING: CPT

## 2018-09-23 PROCEDURE — 6360000002 HC RX W HCPCS: Performed by: FAMILY MEDICINE

## 2018-09-23 PROCEDURE — 97166 OT EVAL MOD COMPLEX 45 MIN: CPT

## 2018-09-23 PROCEDURE — 97530 THERAPEUTIC ACTIVITIES: CPT

## 2018-09-23 RX ORDER — AMLODIPINE BESYLATE 5 MG/1
5 TABLET ORAL DAILY
Status: DISCONTINUED | OUTPATIENT
Start: 2018-09-24 | End: 2018-09-28 | Stop reason: HOSPADM

## 2018-09-23 RX ORDER — NICOTINE 21 MG/24HR
1 PATCH, TRANSDERMAL 24 HOURS TRANSDERMAL DAILY
Status: DISCONTINUED | OUTPATIENT
Start: 2018-09-23 | End: 2018-09-28 | Stop reason: HOSPADM

## 2018-09-23 RX ADMIN — ATORVASTATIN CALCIUM 20 MG: 20 TABLET, FILM COATED ORAL at 21:05

## 2018-09-23 RX ADMIN — METOPROLOL TARTRATE 50 MG: 50 TABLET, FILM COATED ORAL at 10:37

## 2018-09-23 RX ADMIN — TRAMADOL HYDROCHLORIDE 50 MG: 50 TABLET, FILM COATED ORAL at 04:47

## 2018-09-23 RX ADMIN — AMLODIPINE BESYLATE 10 MG: 10 TABLET ORAL at 10:37

## 2018-09-23 RX ADMIN — ACETAMINOPHEN 650 MG: 325 TABLET ORAL at 01:46

## 2018-09-23 RX ADMIN — FAMOTIDINE 20 MG: 20 TABLET ORAL at 21:11

## 2018-09-23 RX ADMIN — METOPROLOL TARTRATE 50 MG: 50 TABLET, FILM COATED ORAL at 21:05

## 2018-09-23 RX ADMIN — ENOXAPARIN SODIUM 40 MG: 40 INJECTION SUBCUTANEOUS at 21:11

## 2018-09-23 RX ADMIN — ASPIRIN 81 MG: 81 TABLET, COATED ORAL at 10:37

## 2018-09-23 RX ADMIN — FAMOTIDINE 20 MG: 20 TABLET ORAL at 10:37

## 2018-09-23 RX ADMIN — TRAMADOL HYDROCHLORIDE 50 MG: 50 TABLET, FILM COATED ORAL at 10:37

## 2018-09-23 RX ADMIN — TRAMADOL HYDROCHLORIDE 50 MG: 50 TABLET, FILM COATED ORAL at 21:20

## 2018-09-23 ASSESSMENT — PAIN DESCRIPTION - ORIENTATION
ORIENTATION: LOWER

## 2018-09-23 ASSESSMENT — PAIN SCALES - GENERAL
PAINLEVEL_OUTOF10: 0
PAINLEVEL_OUTOF10: 7
PAINLEVEL_OUTOF10: 0
PAINLEVEL_OUTOF10: 0
PAINLEVEL_OUTOF10: 8
PAINLEVEL_OUTOF10: 3
PAINLEVEL_OUTOF10: 8

## 2018-09-23 ASSESSMENT — PAIN DESCRIPTION - LOCATION
LOCATION: BACK

## 2018-09-23 ASSESSMENT — PAIN DESCRIPTION - DESCRIPTORS
DESCRIPTORS: ACHING

## 2018-09-23 ASSESSMENT — PAIN DESCRIPTION - PAIN TYPE
TYPE: CHRONIC PAIN

## 2018-09-23 NOTE — PROGRESS NOTES
Social/Functional History:  Lives With: Spouse  Type of Home: House  Home Layout: One level  Home Access: Stairs to enter without rails  Entrance Stairs - Number of Steps: 2 steps  Home Equipment:  (None )     Bathroom Shower/Tub: Tub/Shower unit  Bathroom Toilet: Standard  Bathroom Equipment:  (None )  Bathroom Accessibility: Accessible     ADL Assistance: Independent  Homemaking Assistance: Independent ( completes all yardwork. Pt Indep with all cooking, cleaning, and laundry. )  Homemaking Responsibilities: Yes    Ambulation Assistance: Independent  Transfer Assistance: Independent    Active : Yes  Mode of Transportation: Car  Occupation: Retired  Additional Comments: Pt reports very ind at [employer] with all ADLs/IADLs, ambulated without device. Objective  Overall Cognitive Status: Exceptions  Arousal/Alertness: Appropriate responses to stimuli  Following Commands: Follows one step commands consistently, Follows one step commands with repetition  Attention Span: Attends with cues to redirect  Safety Judgement: Decreased awareness of need for safety, Decreased awareness of need for assistance  Insights: Decreased awareness of deficits  Cognition Comment: Slow processing     Sensation  Overall Sensation Status: WNL          LUE AROM (degrees)  LUE AROM : WFL     RUE AROM (degrees)  RUE AROM : WFL     LUE Strength  Gross LUE Strength: WFL  L Hand Grasp: 4/5  L Hand Release: 4/5    RUE Strength  Gross RUE Strength: WFL  R Hand Grasp: 4/5  R Hand Release: 4/5    ADL  Feeding: Setup, Supervision  Grooming: Contact guard assistance (To complete oral hygiene while standing at sink, SBA to dry and comb hair while sitting EOB. )  UE Bathing: Stand by assistance (to complete in shower while sitting on shower chair. )  LE Bathing: Contact guard assistance (in shower for balance. )  UE Dressing: Setup, Stand by assistance (To don night gown. )  LE Dressing: Minimal assistance (To don B grippy socks.

## 2018-09-24 LAB
ANION GAP SERPL CALCULATED.3IONS-SCNC: 11 MEQ/L (ref 8–16)
BASOPHILS # BLD: 0.1 %
BASOPHILS ABSOLUTE: 0 THOU/MM3 (ref 0–0.1)
BUN BLDV-MCNC: 16 MG/DL (ref 7–22)
CALCIUM SERPL-MCNC: 8.9 MG/DL (ref 8.5–10.5)
CHLORIDE BLD-SCNC: 101 MEQ/L (ref 98–111)
CO2: 31 MEQ/L (ref 23–33)
CREAT SERPL-MCNC: 0.7 MG/DL (ref 0.4–1.2)
EOSINOPHIL # BLD: 0.9 %
EOSINOPHILS ABSOLUTE: 0.1 THOU/MM3 (ref 0–0.4)
ERYTHROCYTE [DISTWIDTH] IN BLOOD BY AUTOMATED COUNT: 12.2 % (ref 11.5–14.5)
ERYTHROCYTE [DISTWIDTH] IN BLOOD BY AUTOMATED COUNT: 41.6 FL (ref 35–45)
GFR SERPL CREATININE-BSD FRML MDRD: 82 ML/MIN/1.73M2
GLUCOSE BLD-MCNC: 91 MG/DL (ref 70–108)
HCT VFR BLD CALC: 35.4 % (ref 37–47)
HEMOGLOBIN: 12.5 GM/DL (ref 12–16)
IMMATURE GRANS (ABS): 0.05 THOU/MM3 (ref 0–0.07)
IMMATURE GRANULOCYTES: 0.5 %
LYMPHOCYTES # BLD: 27.3 %
LYMPHOCYTES ABSOLUTE: 2.6 THOU/MM3 (ref 1–4.8)
MCH RBC QN AUTO: 33.3 PG (ref 26–33)
MCHC RBC AUTO-ENTMCNC: 35.3 GM/DL (ref 32.2–35.5)
MCV RBC AUTO: 94.4 FL (ref 81–99)
MONOCYTES # BLD: 12 %
MONOCYTES ABSOLUTE: 1.2 THOU/MM3 (ref 0.4–1.3)
NUCLEATED RED BLOOD CELLS: 0 /100 WBC
PLATELET # BLD: 352 THOU/MM3 (ref 130–400)
PMV BLD AUTO: 8.7 FL (ref 9.4–12.4)
POTASSIUM SERPL-SCNC: 3.3 MEQ/L (ref 3.5–5.2)
RBC # BLD: 3.75 MILL/MM3 (ref 4.2–5.4)
SEG NEUTROPHILS: 59.2 %
SEGMENTED NEUTROPHILS ABSOLUTE COUNT: 5.7 THOU/MM3 (ref 1.8–7.7)
SODIUM BLD-SCNC: 143 MEQ/L (ref 135–145)
WBC # BLD: 9.6 THOU/MM3 (ref 4.8–10.8)

## 2018-09-24 PROCEDURE — 97110 THERAPEUTIC EXERCISES: CPT

## 2018-09-24 PROCEDURE — 80048 BASIC METABOLIC PNL TOTAL CA: CPT

## 2018-09-24 PROCEDURE — 85025 COMPLETE CBC W/AUTO DIFF WBC: CPT

## 2018-09-24 PROCEDURE — 36415 COLL VENOUS BLD VENIPUNCTURE: CPT

## 2018-09-24 PROCEDURE — 97116 GAIT TRAINING THERAPY: CPT

## 2018-09-24 PROCEDURE — 97530 THERAPEUTIC ACTIVITIES: CPT

## 2018-09-24 PROCEDURE — 6370000000 HC RX 637 (ALT 250 FOR IP): Performed by: FAMILY MEDICINE

## 2018-09-24 PROCEDURE — 6370000000 HC RX 637 (ALT 250 FOR IP): Performed by: INTERNAL MEDICINE

## 2018-09-24 PROCEDURE — 6360000002 HC RX W HCPCS: Performed by: FAMILY MEDICINE

## 2018-09-24 PROCEDURE — 97535 SELF CARE MNGMENT TRAINING: CPT

## 2018-09-24 PROCEDURE — 1290000000 HC SEMI PRIVATE OTHER R&B

## 2018-09-24 PROCEDURE — 97127 HC SP THER IVNTJ W/FOCUS COG FUNCJ: CPT

## 2018-09-24 PROCEDURE — 99232 SBSQ HOSP IP/OBS MODERATE 35: CPT | Performed by: INTERNAL MEDICINE

## 2018-09-24 RX ORDER — POTASSIUM CHLORIDE 20 MEQ/1
40 TABLET, EXTENDED RELEASE ORAL ONCE
Status: COMPLETED | OUTPATIENT
Start: 2018-09-24 | End: 2018-09-24

## 2018-09-24 RX ORDER — ACETAMINOPHEN 160 MG/5ML
650 SOLUTION ORAL EVERY 6 HOURS PRN
Status: DISCONTINUED | OUTPATIENT
Start: 2018-09-24 | End: 2018-09-24 | Stop reason: CLARIF

## 2018-09-24 RX ORDER — ACETAMINOPHEN 325 MG/1
650 TABLET ORAL EVERY 6 HOURS PRN
Status: DISCONTINUED | OUTPATIENT
Start: 2018-09-24 | End: 2018-09-28 | Stop reason: HOSPADM

## 2018-09-24 RX ADMIN — TRAMADOL HYDROCHLORIDE 50 MG: 50 TABLET, FILM COATED ORAL at 19:19

## 2018-09-24 RX ADMIN — TRAMADOL HYDROCHLORIDE 50 MG: 50 TABLET, FILM COATED ORAL at 08:48

## 2018-09-24 RX ADMIN — AMLODIPINE BESYLATE 5 MG: 5 TABLET ORAL at 08:50

## 2018-09-24 RX ADMIN — FAMOTIDINE 20 MG: 20 TABLET ORAL at 20:44

## 2018-09-24 RX ADMIN — METOPROLOL TARTRATE 50 MG: 50 TABLET, FILM COATED ORAL at 08:50

## 2018-09-24 RX ADMIN — ACETAMINOPHEN 650 MG: 325 TABLET ORAL at 01:44

## 2018-09-24 RX ADMIN — METOPROLOL TARTRATE 50 MG: 50 TABLET, FILM COATED ORAL at 20:44

## 2018-09-24 RX ADMIN — POTASSIUM CHLORIDE 40 MEQ: 20 TABLET, EXTENDED RELEASE ORAL at 13:27

## 2018-09-24 RX ADMIN — ASPIRIN 81 MG: 81 TABLET, COATED ORAL at 08:49

## 2018-09-24 RX ADMIN — FAMOTIDINE 20 MG: 20 TABLET ORAL at 08:49

## 2018-09-24 RX ADMIN — ENOXAPARIN SODIUM 40 MG: 40 INJECTION SUBCUTANEOUS at 21:32

## 2018-09-24 RX ADMIN — ATORVASTATIN CALCIUM 20 MG: 20 TABLET, FILM COATED ORAL at 20:44

## 2018-09-24 ASSESSMENT — PAIN DESCRIPTION - PAIN TYPE: TYPE: CHRONIC PAIN

## 2018-09-24 ASSESSMENT — PAIN DESCRIPTION - PROGRESSION: CLINICAL_PROGRESSION: NOT CHANGED

## 2018-09-24 ASSESSMENT — PAIN DESCRIPTION - ORIENTATION
ORIENTATION: LOWER
ORIENTATION: LOWER

## 2018-09-24 ASSESSMENT — PAIN SCALES - GENERAL
PAINLEVEL_OUTOF10: 9
PAINLEVEL_OUTOF10: 8
PAINLEVEL_OUTOF10: 0
PAINLEVEL_OUTOF10: 8
PAINLEVEL_OUTOF10: 3
PAINLEVEL_OUTOF10: 0
PAINLEVEL_OUTOF10: 9
PAINLEVEL_OUTOF10: 8

## 2018-09-24 ASSESSMENT — PAIN DESCRIPTION - LOCATION
LOCATION: BACK
LOCATION: BACK

## 2018-09-24 ASSESSMENT — PAIN DESCRIPTION - ONSET: ONSET: GRADUAL

## 2018-09-24 ASSESSMENT — PAIN DESCRIPTION - DESCRIPTORS: DESCRIPTORS: ACHING

## 2018-09-24 NOTE — PROGRESS NOTES
Nutrition Assessment    Type and Reason for Visit: Initial, Consult (TCU nutrition assessment)    Nutrition Recommendations:   Continue current diet    Malnutrition Assessment:  · Malnutrition Status: No malnutrition  · Findings of the 6 clinical characteristics of malnutrition (Minimum of 2 out of 6 clinical characteristics is required to make the diagnosis of moderate or severe Protein Calorie Malnutrition based on AND/ASPEN Guidelines):  1. Energy Intake- (varies), not able to assess    2. Weight Loss-No significant weight loss,    3. Fat Loss-No significant subcutaneous fat loss,    4. Muscle Loss-No significant muscle mass loss,    5. Fluid Accumulation-No significant fluid accumulation,    6.  Strength-Not measured    Nutrition Diagnosis:   · Problem: Altered GI function  · Etiology: related to Diarrhea     Signs and symptoms:  as evidenced by Patient report of    Nutrition Assessment:  · Subjective Assessment: Patient admitted with generalized weakness. Patient with West Nile Virus. Patient seen and reports that her appetite is good. Patient reports no issues with chewing/swallowing. Patient did mention that she is having some loose stools. Offered yogurt, however patient denies liking. Offered to suggest probiotic start to MD, however patient does not want to take another pill. Patient says that it will be fine and no need for intervention. Note SLP on case. Note patient's K+ is low at 3.3.    · Wound Type: None  · Current Nutrition Therapies:  · Oral Diet Orders: General   · Oral Diet intake: 26-50%, %  · Oral Nutrition Supplement (ONS) Orders: None  · Anthropometric Measures:  · Ht: 5' 1\" (154.9 cm)   · Current Body Wt: 118 lb (53.5 kg) (9/24, no weight source, no edema)  · Admission Body Wt: 119 lb (54 kg) (9/21, no weight source)  · Usual Body Wt: 116 lb (52.6 kg) ([facility], 9/15/18, standing scale)  · % Weight Change: no significant change,    · Ideal Body Wt: 105 lb (47.6 kg), % Ideal

## 2018-09-24 NOTE — PROGRESS NOTES
Patient ambulated to bathroom-one assist with rolling walker and gait belt-slow/steady gait noted. Voided, rudy care provided. Returned to bed, pillow support provided. Bed low, wheels locked, alarm set, bedside table and call light in reach. Denies any needs or concerns at this time.  Ezio Simon, [facility] SN

## 2018-09-24 NOTE — PROGRESS NOTES
Long term goal 1: Pt will complete BADL routine including showering tasks with S and 0 vc for safety to increase indep and safety in home environment. Long term goal 2: Pt will complete simple IADl task with S and 0 vc for safety to increase indep and safety with meal prep in home environment.

## 2018-09-24 NOTE — PLAN OF CARE
Problem: Nutrition  Goal: Optimal nutrition therapy  Outcome: Ongoing  Nutrition Problem: Altered GI function  Intervention: Food and/or Nutrient Delivery: Continue current diet  Nutritional Goals: Patient will have soft/formed bowel movements during LOS

## 2018-09-25 LAB
ANAEROBIC CULTURE: NORMAL
CSF CULTURE: NORMAL
GRAM STAIN RESULT: NORMAL
POTASSIUM SERPL-SCNC: 3.8 MEQ/L (ref 3.5–5.2)

## 2018-09-25 PROCEDURE — 84132 ASSAY OF SERUM POTASSIUM: CPT

## 2018-09-25 PROCEDURE — 6370000000 HC RX 637 (ALT 250 FOR IP): Performed by: FAMILY MEDICINE

## 2018-09-25 PROCEDURE — 97116 GAIT TRAINING THERAPY: CPT

## 2018-09-25 PROCEDURE — 97530 THERAPEUTIC ACTIVITIES: CPT

## 2018-09-25 PROCEDURE — 36415 COLL VENOUS BLD VENIPUNCTURE: CPT

## 2018-09-25 PROCEDURE — 6370000000 HC RX 637 (ALT 250 FOR IP): Performed by: INTERNAL MEDICINE

## 2018-09-25 PROCEDURE — 1290000000 HC SEMI PRIVATE OTHER R&B

## 2018-09-25 PROCEDURE — 97110 THERAPEUTIC EXERCISES: CPT

## 2018-09-25 PROCEDURE — 6360000002 HC RX W HCPCS: Performed by: FAMILY MEDICINE

## 2018-09-25 PROCEDURE — 97127 HC SP THER IVNTJ W/FOCUS COG FUNCJ: CPT

## 2018-09-25 PROCEDURE — 97535 SELF CARE MNGMENT TRAINING: CPT

## 2018-09-25 RX ADMIN — AMLODIPINE BESYLATE 5 MG: 5 TABLET ORAL at 09:09

## 2018-09-25 RX ADMIN — FAMOTIDINE 20 MG: 20 TABLET ORAL at 20:27

## 2018-09-25 RX ADMIN — METOPROLOL TARTRATE 50 MG: 50 TABLET, FILM COATED ORAL at 20:27

## 2018-09-25 RX ADMIN — ENOXAPARIN SODIUM 40 MG: 40 INJECTION SUBCUTANEOUS at 20:27

## 2018-09-25 RX ADMIN — ASPIRIN 81 MG: 81 TABLET, COATED ORAL at 09:08

## 2018-09-25 RX ADMIN — FAMOTIDINE 20 MG: 20 TABLET ORAL at 09:08

## 2018-09-25 RX ADMIN — METOPROLOL TARTRATE 50 MG: 50 TABLET, FILM COATED ORAL at 09:09

## 2018-09-25 RX ADMIN — ATORVASTATIN CALCIUM 20 MG: 20 TABLET, FILM COATED ORAL at 20:27

## 2018-09-25 RX ADMIN — TRAMADOL HYDROCHLORIDE 50 MG: 50 TABLET, FILM COATED ORAL at 04:23

## 2018-09-25 ASSESSMENT — PAIN SCALES - GENERAL
PAINLEVEL_OUTOF10: 0
PAINLEVEL_OUTOF10: 5
PAINLEVEL_OUTOF10: 0
PAINLEVEL_OUTOF10: 9

## 2018-09-25 ASSESSMENT — PAIN DESCRIPTION - ORIENTATION: ORIENTATION: LOWER

## 2018-09-25 ASSESSMENT — PAIN DESCRIPTION - LOCATION: LOCATION: BACK

## 2018-09-26 PROCEDURE — 97110 THERAPEUTIC EXERCISES: CPT

## 2018-09-26 PROCEDURE — 6370000000 HC RX 637 (ALT 250 FOR IP): Performed by: INTERNAL MEDICINE

## 2018-09-26 PROCEDURE — 6360000002 HC RX W HCPCS: Performed by: FAMILY MEDICINE

## 2018-09-26 PROCEDURE — 6370000000 HC RX 637 (ALT 250 FOR IP): Performed by: FAMILY MEDICINE

## 2018-09-26 PROCEDURE — 97127 HC SP THER IVNTJ W/FOCUS COG FUNCJ: CPT | Performed by: SPEECH-LANGUAGE PATHOLOGIST

## 2018-09-26 PROCEDURE — 1290000000 HC SEMI PRIVATE OTHER R&B

## 2018-09-26 PROCEDURE — 97116 GAIT TRAINING THERAPY: CPT

## 2018-09-26 PROCEDURE — 97530 THERAPEUTIC ACTIVITIES: CPT

## 2018-09-26 RX ADMIN — METOPROLOL TARTRATE 50 MG: 50 TABLET, FILM COATED ORAL at 19:59

## 2018-09-26 RX ADMIN — FAMOTIDINE 20 MG: 20 TABLET ORAL at 09:29

## 2018-09-26 RX ADMIN — ASPIRIN 81 MG: 81 TABLET, COATED ORAL at 09:29

## 2018-09-26 RX ADMIN — AMLODIPINE BESYLATE 5 MG: 5 TABLET ORAL at 09:29

## 2018-09-26 RX ADMIN — ENOXAPARIN SODIUM 40 MG: 40 INJECTION SUBCUTANEOUS at 09:30

## 2018-09-26 RX ADMIN — ACETAMINOPHEN 650 MG: 325 TABLET ORAL at 09:39

## 2018-09-26 RX ADMIN — TRAMADOL HYDROCHLORIDE 50 MG: 50 TABLET, FILM COATED ORAL at 06:24

## 2018-09-26 RX ADMIN — FAMOTIDINE 20 MG: 20 TABLET ORAL at 20:00

## 2018-09-26 RX ADMIN — ACETAMINOPHEN 650 MG: 325 TABLET ORAL at 20:00

## 2018-09-26 RX ADMIN — ATORVASTATIN CALCIUM 20 MG: 20 TABLET, FILM COATED ORAL at 19:59

## 2018-09-26 RX ADMIN — METOPROLOL TARTRATE 50 MG: 50 TABLET, FILM COATED ORAL at 09:29

## 2018-09-26 ASSESSMENT — PAIN SCALES - GENERAL
PAINLEVEL_OUTOF10: 0
PAINLEVEL_OUTOF10: 4
PAINLEVEL_OUTOF10: 5
PAINLEVEL_OUTOF10: 2
PAINLEVEL_OUTOF10: 4
PAINLEVEL_OUTOF10: 4
PAINLEVEL_OUTOF10: 8
PAINLEVEL_OUTOF10: 7

## 2018-09-26 ASSESSMENT — PAIN DESCRIPTION - LOCATION: LOCATION: BACK

## 2018-09-26 ASSESSMENT — PAIN DESCRIPTION - ORIENTATION: ORIENTATION: LOWER

## 2018-09-26 NOTE — PROGRESS NOTES
[address] [address]  INPATIENT SPEECH THERAPY  [facility]    TIME   SLP Individual Minutes  Time In: 0945  Time Out: 7335  Minutes: 30  Timed Code Treatment Minutes: 30 Minutes       [x]Daily Note  []Progress Note  []Discharge Note    Date: 2018  Patient Name: Abilio Perez        MRN: [id number]    : 1945  (81 y.o.)  Gender: female   Primary Provider: Negar Pillai MD  Admitting Diagnosis:  Generalized weakness  Secondary Diagnosis: Cognitive deficits  Precautions: Fall risk  Swallowing Status/Diet: Regular, thin  DATE of last MBS:  N/A  Pain:  6/10    Subjective: Pt sitting upright in bed, pleasant and agreeable to treatment, but with slow response time for all tasks completed. SHORT TERM GOAL #1:  Goal 1: Pt will complete orientation, delayed recall tasks and working memory tasks with 80% accuracy provided min cues to improve retention of new information   INTERVENTIONS: Orientation: 7/8 independent, difficulty with the day of the week. Daily recall- Not able to recall name of OT that she worked with this am, but did state that the therapist was getting  in December, and was able to provide a visual description. Pt given a physician appointment to recall containing 5 units of information.  -Immediate recall- 3/5 independently, 2/5 unable  *Provided instruction on importance of writing information down for future review. Pt expressed understanding and reports that she typically does this at home. Patients personal calendar was sitting on the bedside table with filled out events and crossed off days. Encouraged patient to wrote out the appointment to improve retention of information later in the session.   -Following a 15 minute delay- 5/5 independently.      SHORT TERM GOAL #2:  Goal 2: Pt will complete functional problem solving and reasoning tasks (medication, finances, ect) provided mod cues with 70% accuracy to improve completion of high level home management tasks

## 2018-09-26 NOTE — PROGRESS NOTES
assistance  Functional Mobility Comments: To/from therapy gym and throughout therapy kitchen at slow pace. No LOB noted. Required seatd rest break after each trial of mobility      Type of ROM/Therapeutic Exercise: AROM (yellow theraband)  Comment: Patient completed BUE exercises x15 reps x1 set in all joints/planes using yellow tband at slow pace. Required rest break after each exercise. Completed exercises to increae strength and activity tolerance required for ADLs and toilet transfers. Activity Tolerance:  Activity Tolerance: Patient limited by fatigue;Patient limited by pain    Assessment:     Performance deficits / Impairments: Decreased functional mobility , Decreased ADL status, Decreased strength, Decreased safe awareness, Decreased balance, Decreased endurance  Prognosis: Good  Discharge Recommendations: Home with Home health OT    Patient Education:  Patient Education: safety with transfers and mobility, HEP,  safety within kitchen using RW    Equipment Recommendations:  Equipment Needed: No  Other: Patient reports she does not need any equipment at discharge. Safety:  Safety Devices in place: Yes  Type of devices: Bed alarm in place, Gait belt, Left in bed, Nurse notified, Call light within reach    Plan:  Times per week: 6x  Current Treatment Recommendations: Strengthening, Balance Training, Functional Mobility Training, Endurance Training, Safety Education & Training, Self-Care / ADL, Patient/Caregiver Education & Training, Home Management Training, Equipment Evaluation, Education, & procurement    Goals:  Patient goals : To go home    Short term goals  Time Frame for Short term goals: 1 week  Short term goal 1: pt to demonstrate dynamic standing balance x5 mins with 1-2 hand release at SBA for inc ind with bathing tasks. Short term goal 2: Pt to complete functional mobility at [facility] distances with S and 0 vc for safety to increase indep and safety with toileting.    Short term goal 3: Pt to complete LB ADLs using LHAE prn with no greater than CGA for inc ind with self cares in home environment. Short term goal 4: Pt will complete BUE theraband HEP with min vc for technique to increase indep and safety with all self cares and transfers. Long term goals  Time Frame for Long term goals : 2 weeks   Long term goal 1: Pt will complete BADL routine including showering tasks with S and 0 vc for safety to increase indep and safety in home environment. Long term goal 2: Pt will complete simple IADl task with S and 0 vc for safety to increase indep and safety with meal prep in home environment.

## 2018-09-27 PROCEDURE — 97530 THERAPEUTIC ACTIVITIES: CPT

## 2018-09-27 PROCEDURE — 1290000000 HC SEMI PRIVATE OTHER R&B

## 2018-09-27 PROCEDURE — 97535 SELF CARE MNGMENT TRAINING: CPT

## 2018-09-27 PROCEDURE — 6360000002 HC RX W HCPCS: Performed by: FAMILY MEDICINE

## 2018-09-27 PROCEDURE — 6370000000 HC RX 637 (ALT 250 FOR IP): Performed by: FAMILY MEDICINE

## 2018-09-27 PROCEDURE — 6370000000 HC RX 637 (ALT 250 FOR IP): Performed by: INTERNAL MEDICINE

## 2018-09-27 PROCEDURE — 97110 THERAPEUTIC EXERCISES: CPT

## 2018-09-27 RX ORDER — AMLODIPINE BESYLATE 5 MG/1
5 TABLET ORAL DAILY
Qty: 30 TABLET | Refills: 0 | Status: SHIPPED | OUTPATIENT
Start: 2018-09-28

## 2018-09-27 RX ORDER — ACETAMINOPHEN 325 MG/1
650 TABLET ORAL EVERY 6 HOURS PRN
COMMUNITY
Start: 2018-09-27

## 2018-09-27 RX ORDER — ALBUTEROL SULFATE 90 UG/1
2 AEROSOL, METERED RESPIRATORY (INHALATION) EVERY 4 HOURS PRN
Qty: 1 INHALER | Refills: 0 | Status: SHIPPED | OUTPATIENT
Start: 2018-09-27

## 2018-09-27 RX ORDER — METOPROLOL TARTRATE 50 MG/1
50 TABLET, FILM COATED ORAL 2 TIMES DAILY
Qty: 60 TABLET | Refills: 0 | Status: SHIPPED | OUTPATIENT
Start: 2018-09-28

## 2018-09-27 RX ORDER — ATORVASTATIN CALCIUM 20 MG/1
20 TABLET, FILM COATED ORAL NIGHTLY
Qty: 30 TABLET | Refills: 0 | Status: SHIPPED | OUTPATIENT
Start: 2018-09-28

## 2018-09-27 RX ORDER — ASPIRIN 81 MG/1
81 TABLET ORAL DAILY
COMMUNITY
Start: 2018-09-28

## 2018-09-27 RX ADMIN — FAMOTIDINE 20 MG: 20 TABLET ORAL at 09:00

## 2018-09-27 RX ADMIN — FAMOTIDINE 20 MG: 20 TABLET ORAL at 22:10

## 2018-09-27 RX ADMIN — METOPROLOL TARTRATE 50 MG: 50 TABLET, FILM COATED ORAL at 22:10

## 2018-09-27 RX ADMIN — AMLODIPINE BESYLATE 5 MG: 5 TABLET ORAL at 09:00

## 2018-09-27 RX ADMIN — TRAMADOL HYDROCHLORIDE 50 MG: 50 TABLET, FILM COATED ORAL at 22:10

## 2018-09-27 RX ADMIN — ATORVASTATIN CALCIUM 20 MG: 20 TABLET, FILM COATED ORAL at 22:10

## 2018-09-27 RX ADMIN — METOPROLOL TARTRATE 50 MG: 50 TABLET, FILM COATED ORAL at 09:00

## 2018-09-27 RX ADMIN — TRAMADOL HYDROCHLORIDE 50 MG: 50 TABLET, FILM COATED ORAL at 03:02

## 2018-09-27 RX ADMIN — ASPIRIN 81 MG: 81 TABLET, COATED ORAL at 09:00

## 2018-09-27 RX ADMIN — ENOXAPARIN SODIUM 40 MG: 40 INJECTION SUBCUTANEOUS at 09:01

## 2018-09-27 ASSESSMENT — PAIN DESCRIPTION - DESCRIPTORS: DESCRIPTORS: ACHING

## 2018-09-27 ASSESSMENT — PAIN SCALES - WONG BAKER: WONGBAKER_NUMERICALRESPONSE: 2

## 2018-09-27 ASSESSMENT — PAIN SCALES - GENERAL
PAINLEVEL_OUTOF10: 5
PAINLEVEL_OUTOF10: 0
PAINLEVEL_OUTOF10: 7

## 2018-09-27 ASSESSMENT — PAIN DESCRIPTION - LOCATION
LOCATION: GENERALIZED
LOCATION: GENERALIZED;LEG

## 2018-09-27 ASSESSMENT — PAIN DESCRIPTION - ONSET: ONSET: ON-GOING

## 2018-09-27 ASSESSMENT — PAIN DESCRIPTION - FREQUENCY: FREQUENCY: INTERMITTENT

## 2018-09-27 ASSESSMENT — PAIN DESCRIPTION - PAIN TYPE
TYPE: CHRONIC PAIN
TYPE: CHRONIC PAIN

## 2018-09-27 NOTE — PROGRESS NOTES
MET  Long term goal 2: Pt to ambulate > 250 ft with/without RW with Mod I for household distances - NOT MET  Long term goal 3: Pt to negotiate 2 small steps with no rail with Mod I for home access - NOT MET  Long term goal 4: patient to perform car transfer at S for transportation to and from medical appointments - MET

## 2018-09-27 NOTE — PROGRESS NOTES
home environment. NOT MET  Long term goal 2: Pt will complete simple IADl task with S and 0 vc for safety to increase indep and safety with meal prep in home environment.  NOT MET

## 2018-09-28 VITALS
TEMPERATURE: 98.4 F | WEIGHT: 114.6 LBS | HEART RATE: 68 BPM | OXYGEN SATURATION: 93 % | BODY MASS INDEX: 21.64 KG/M2 | HEIGHT: 61 IN | SYSTOLIC BLOOD PRESSURE: 117 MMHG | DIASTOLIC BLOOD PRESSURE: 74 MMHG | RESPIRATION RATE: 20 BRPM

## 2018-09-28 PROCEDURE — 6360000002 HC RX W HCPCS: Performed by: FAMILY MEDICINE

## 2018-09-28 PROCEDURE — 0220000000 HC SKILLED NURSING FACILITY

## 2018-09-28 PROCEDURE — 6370000000 HC RX 637 (ALT 250 FOR IP): Performed by: FAMILY MEDICINE

## 2018-09-28 PROCEDURE — 97127 HC SP THER IVNTJ W/FOCUS COG FUNCJ: CPT

## 2018-09-28 PROCEDURE — 6370000000 HC RX 637 (ALT 250 FOR IP): Performed by: INTERNAL MEDICINE

## 2018-09-28 RX ADMIN — FAMOTIDINE 20 MG: 20 TABLET ORAL at 09:07

## 2018-09-28 RX ADMIN — ENOXAPARIN SODIUM 40 MG: 40 INJECTION SUBCUTANEOUS at 09:08

## 2018-09-28 RX ADMIN — METOPROLOL TARTRATE 50 MG: 50 TABLET, FILM COATED ORAL at 09:07

## 2018-09-28 RX ADMIN — ASPIRIN 81 MG: 81 TABLET, COATED ORAL at 09:07

## 2018-09-28 RX ADMIN — AMLODIPINE BESYLATE 5 MG: 5 TABLET ORAL at 09:07

## 2018-09-28 ASSESSMENT — PAIN SCALES - GENERAL: PAINLEVEL_OUTOF10: 0

## 2018-09-28 NOTE — PROGRESS NOTES
[facility]  INPATIENT SPEECH THERAPY  [facility]    TIME   SLP Individual Minutes  Time In: 0930  Time Out: 1000  Minutes: 30  Timed Code Treatment Minutes: 30 Minutes       [x]Discharge Note    Date: 2018  Patient Name: Vasquez Clark        MRN: [id number]    : 1945  (72 y.o.)  Gender: female   Primary Provider: Darshan Fuentes MD  Admitting Diagnosis:  Generalized weakness  Secondary Diagnosis: Cognitive deficits  Precautions: Fall risk  Swallowing Status/Diet: Regular, thin  DATE of last MBS:  N/A  Pain:  0/10    Subjective: Pt sitting in recliner, pleasant and agreeable to treatment. Patient reports, \"I am just tired. \"  Patient took 2 short phone calls throughout therapy session as today is patient's birthday. NO family present. SHORT TERM GOAL #1:  Goal 1: Pt will complete orientation, delayed recall tasks and working memory tasks with 80% accuracy provided min cues to improve retention of new information. GOAL NOT MET. INTERVENTIONS: Patient continues to demonstrate with overt difficulty recalling rational for hospital admission. Patient reporting, \"I got bit by a bug and it was the [id number] bug. \"  RN confirmed this is incorrect, it was not the [address]. Pt provided with 4 locations to recall throughout therapy session; cued to utilize compensatory memory strategies to improve recall of 4 locations. Immediate recall:  2/4 indep, 2/4 mod cues. 10 minute delay:  3/4 indep, 1/4 mod cues      SHORT TERM GOAL #2:  Goal 2: Pt will complete functional problem solving and reasoning tasks (medication, finances, ect) provided mod cues with 70% accuracy to improve completion of high level home management tasks  GOAL NOT MET.     INTERVENTIONS: Patient presented with hypothetical problem solving situations, cued to generate safe/functional solution considering present health conditions:   indep    Reasoning via answer corresponding questions provided 3 day weather forecast:

## 2018-09-28 NOTE — PLAN OF CARE
Problem: DISCHARGE BARRIERS  Goal: Patient's continuum of care needs are met    Intervention: INVOLVE PATIENT/S.O. IN DISCHARGE PLANNING PROCESS  The patient is discharging home from [address] [place] [address] today, 9/28. A referral has been made to [facility] for nursing visits, OT, PT, ST, & aide services. Patient's significant other and her son and daughter-in-law will be available to provides assistance and supervision. The patient is happy to be leaving the hospital today, which also happens to be her birthday! [employer] will be notified of the discharge. Discharge goals have been met.  Vallarie Burkitt, LSW

## 2018-09-28 NOTE — PLAN OF CARE
Problem: Risk for Impaired Skin Integrity  Goal: Tissue integrity - skin and mucous membranes  Structural intactness and normal physiological function of skin and  mucous membranes.    Outcome: Completed Date Met: 09/28/18      Problem: Falls - Risk of:  Goal: Will remain free from falls  Will remain free from falls   Outcome: Completed Date Met: 09/28/18      Problem: Bleeding:  Goal: Will show no signs and symptoms of excessive bleeding  Will show no signs and symptoms of excessive bleeding   Outcome: Completed Date Met: 09/28/18      Problem: Pain:  Goal: Pain level will decrease  Pain level will decrease   Outcome: Completed Date Met: 09/28/18    Goal: Control of acute pain  Control of acute pain   Outcome: Completed Date Met: 09/28/18      Problem: Gas Exchange - Impaired:  Goal: Levels of oxygenation will improve  Levels of oxygenation will improve   Outcome: Completed Date Met: 09/28/18      Problem: Mobility - Impaired:  Goal: Mobility will improve  Mobility will improve   Outcome: Completed Date Met: 09/28/18      Problem: Nutrition  Goal: Optimal nutrition therapy  Outcome: Completed Date Met: 09/28/18      Problem: DISCHARGE BARRIERS  Goal: Patient's continuum of care needs are met  Outcome: Completed Date Met: 09/28/18

## 2021-10-13 ENCOUNTER — NURSE TRIAGE (OUTPATIENT)
Dept: OTHER | Facility: CLINIC | Age: 76
End: 2021-10-13